# Patient Record
Sex: FEMALE | Race: WHITE | Employment: PART TIME | URBAN - METROPOLITAN AREA
[De-identification: names, ages, dates, MRNs, and addresses within clinical notes are randomized per-mention and may not be internally consistent; named-entity substitution may affect disease eponyms.]

---

## 2016-11-03 LAB
ANTIBODY SCREEN, EXTERNAL: NEGATIVE
CHLAMYDIA, EXTERNAL: NEGATIVE
HBSAG, EXTERNAL: NEGATIVE
HIV, EXTERNAL: NONREACTIVE
N. GONORRHEA, EXTERNAL: NEGATIVE
RPR, EXTERNAL: NONREACTIVE
RUBELLA, EXTERNAL: NORMAL
TYPE, ABO & RH, EXTERNAL: NORMAL

## 2017-02-07 LAB — GRBS, EXTERNAL: POSITIVE

## 2017-02-27 ENCOUNTER — HOSPITAL ENCOUNTER (INPATIENT)
Age: 32
LOS: 3 days | Discharge: HOME | DRG: 560 | End: 2017-03-02
Attending: SPECIALIST | Admitting: SPECIALIST
Payer: MEDICAID

## 2017-02-27 PROBLEM — Z34.90 PREGNANCY: Status: ACTIVE | Noted: 2017-02-27

## 2017-02-27 LAB
ABO + RH BLD: NORMAL
BLOOD GROUP ANTIBODIES SERPL: NORMAL
ERYTHROCYTE [DISTWIDTH] IN BLOOD BY AUTOMATED COUNT: 17 % (ref 11.5–14.5)
HCT VFR BLD AUTO: 24.2 % (ref 35–47)
HGB BLD-MCNC: 7.2 G/DL (ref 11.5–16)
MCH RBC QN AUTO: 22.9 PG (ref 26–34)
MCHC RBC AUTO-ENTMCNC: 29.8 G/DL (ref 30–36.5)
MCV RBC AUTO: 76.8 FL (ref 80–99)
PLATELET # BLD AUTO: 171 K/UL (ref 150–400)
RBC # BLD AUTO: 3.15 M/UL (ref 3.8–5.2)
SPECIMEN EXP DATE BLD: NORMAL
WBC # BLD AUTO: 7.2 K/UL (ref 3.6–11)

## 2017-02-27 PROCEDURE — 86900 BLOOD TYPING SEROLOGIC ABO: CPT | Performed by: SPECIALIST

## 2017-02-27 PROCEDURE — 74011250637 HC RX REV CODE- 250/637: Performed by: SPECIALIST

## 2017-02-27 PROCEDURE — 10907ZC DRAINAGE OF AMNIOTIC FLUID, THERAPEUTIC FROM PRODUCTS OF CONCEPTION, VIA NATURAL OR ARTIFICIAL OPENING: ICD-10-PCS | Performed by: SPECIALIST

## 2017-02-27 PROCEDURE — 36415 COLL VENOUS BLD VENIPUNCTURE: CPT | Performed by: SPECIALIST

## 2017-02-27 PROCEDURE — 85027 COMPLETE CBC AUTOMATED: CPT | Performed by: SPECIALIST

## 2017-02-27 PROCEDURE — 74011000258 HC RX REV CODE- 258: Performed by: SPECIALIST

## 2017-02-27 PROCEDURE — 59200 INSERT CERVICAL DILATOR: CPT

## 2017-02-27 PROCEDURE — 65270000029 HC RM PRIVATE

## 2017-02-27 PROCEDURE — 74011250636 HC RX REV CODE- 250/636: Performed by: SPECIALIST

## 2017-02-27 RX ORDER — ZOLPIDEM TARTRATE 5 MG/1
5 TABLET ORAL
Status: DISCONTINUED | OUTPATIENT
Start: 2017-02-27 | End: 2017-03-02 | Stop reason: HOSPADM

## 2017-02-27 RX ORDER — SODIUM CHLORIDE, SODIUM LACTATE, POTASSIUM CHLORIDE, CALCIUM CHLORIDE 600; 310; 30; 20 MG/100ML; MG/100ML; MG/100ML; MG/100ML
125 INJECTION, SOLUTION INTRAVENOUS CONTINUOUS
Status: DISCONTINUED | OUTPATIENT
Start: 2017-02-27 | End: 2017-03-02 | Stop reason: HOSPADM

## 2017-02-27 RX ORDER — OXYTOCIN IN 5 % DEXTROSE 30/500 ML
1-25 PLASTIC BAG, INJECTION (ML) INTRAVENOUS
Status: DISCONTINUED | OUTPATIENT
Start: 2017-02-27 | End: 2017-03-02 | Stop reason: HOSPADM

## 2017-02-27 RX ORDER — NALBUPHINE HYDROCHLORIDE 10 MG/ML
10 INJECTION, SOLUTION INTRAMUSCULAR; INTRAVENOUS; SUBCUTANEOUS
Status: DISCONTINUED | OUTPATIENT
Start: 2017-02-27 | End: 2017-03-02 | Stop reason: HOSPADM

## 2017-02-27 RX ORDER — SODIUM CHLORIDE 0.9 % (FLUSH) 0.9 %
5-10 SYRINGE (ML) INJECTION AS NEEDED
Status: DISCONTINUED | OUTPATIENT
Start: 2017-02-27 | End: 2017-03-02 | Stop reason: HOSPADM

## 2017-02-27 RX ORDER — SODIUM CHLORIDE 0.9 % (FLUSH) 0.9 %
5-10 SYRINGE (ML) INJECTION EVERY 8 HOURS
Status: DISCONTINUED | OUTPATIENT
Start: 2017-02-27 | End: 2017-03-02 | Stop reason: HOSPADM

## 2017-02-27 RX ADMIN — AMPICILLIN SODIUM 1 G: 1 INJECTION, POWDER, FOR SOLUTION INTRAMUSCULAR; INTRAVENOUS at 21:46

## 2017-02-27 RX ADMIN — AMPICILLIN SODIUM 2 G: 2 INJECTION, POWDER, FOR SOLUTION INTRAMUSCULAR; INTRAVENOUS at 17:52

## 2017-02-27 RX ADMIN — ZOLPIDEM TARTRATE 5 MG: 5 TABLET ORAL at 23:41

## 2017-02-27 RX ADMIN — NALBUPHINE HYDROCHLORIDE 10 MG: 10 INJECTION, SOLUTION INTRAMUSCULAR; INTRAVENOUS; SUBCUTANEOUS at 21:48

## 2017-02-27 NOTE — PROGRESS NOTES
1639 Patient arrived ambulatory from home for scheduled cervidil induction to L&D  2. Monitors applied. 1 Dr. Radha Youssef at bedside discussing [address]. Viewed fetal monitor. SVE for 3-4/50/0. Decided against cervidil. 65 Notified Dr. Radha Youssef of 7.2 hgb. Type and screen ordered    1850 Off monitor to walk/ birthing ball. 1945 Bedside and Verbal shift change report given to SYED Kilpatrick RN (oncoming nurse) by Lavon Nicolas RN (offgoing nurse). Report included the following information SBAR, Kardex, Procedure Summary, Intake/Output, MAR and Recent Results.

## 2017-02-27 NOTE — IP AVS SNAPSHOT
Current Discharge Medication List  
  
Take these medications at their scheduled times Dose & Instructions Dispensing Information Comments Morning Noon Evening Bedtime  
 docusate sodium 100 mg capsule Commonly known as:  Ltanya Florentin Your next dose is: Today, Tomorrow Other:  ____________ Dose:  100 mg Take 1 Cap by mouth two (2) times a day for 90 days. Quantity:  60 Cap Refills:  2 FLINTSTONES MULTIVITAMIN chewable tablet Generic drug:  pediatric multivitamins Your next dose is: Today, Tomorrow Other:  ____________ Dose:  1 Tab Take 1 Tab by mouth daily. Refills:  0  
     
   
   
   
  
 ibuprofen 800 mg tablet Commonly known as:  MOTRIN Your next dose is: Today, Tomorrow Other:  ____________ Dose:  800 mg Take 1 Tab by mouth every eight (8) hours. Quantity:  30 Tab Refills:  1 Take these medications as needed Dose & Instructions Dispensing Information Comments Morning Noon Evening Bedtime  
 oxyCODONE-acetaminophen 7.5-325 mg per tablet Commonly known as:  PERCOCET 7.5 Your next dose is: Today, Tomorrow Other:  ____________ Dose:  1 Tab Take 1 Tab by mouth every four (4) hours as needed. Max Daily Amount: 6 Tabs. Quantity:  15 Tab Refills:  0  
     
   
   
   
  
 zolpidem 10 mg tablet Commonly known as:  AMBIEN Your next dose is: Today, Tomorrow Other:  ____________ Dose:  10 mg Take 10 mg by mouth nightly as needed for Sleep. Indications: SLEEP-ONSET INSOMNIA Refills:  0 Where to Get Your Medications Information about where to get these medications is not yet available ! Ask your nurse or doctor about these medications  
  docusate sodium 100 mg capsule  
 ibuprofen 800 mg tablet  
 oxyCODONE-acetaminophen 7.5-325 mg per tablet

## 2017-02-27 NOTE — H&P
History & Physical    Name: Saida White MRN: [id number]  SSN: [id number]    YOB: 1985  Age: 32 y.o. Sex: female        Subjective:     Estimated Date of Delivery: None noted. OB History    Para Term  AB SAB TAB Ectopic Multiple Living   7 6 5 1     0 6      # Outcome Date GA Lbr Rian/2nd Weight Sex Delivery Anes PTL Lv   7 Current            6 Term 12/31/15 38w2d / 00:12 3.32 kg M VAGINAL DELI EPIDURAL AN N Y   5 Term 01/15/14 39w1d 05:15 / 00:08 4.32 kg M VAGINAL DELI  N Y   4 Term 12 37w4d 18:55 / 00:14 3.25 kg F VAGINAL DELI EPIDURAL AN N Y   3 Term 12/02/10        Y   2 Term 07 38w0d   F VAGINAL DELI   Y   1  05 33w0d   M VAGINAL DELI None  Y          Ms. Felipe Gómez is admitted with pregnancy at Unknown for early labor/PIH. Prenatal course was normal. Please see prenatal records for details. Past Medical History:   Diagnosis Date    Abnormal Pap smear     HPV/ ASCUS    Anemia     with this pregnancy    Anemia NEC     Chlamydia     treated 2013    Essential hypertension     with previous pregnancies    Genital herpes, unspecified     non in 5 years not on valtrex    Gonorrhea     unknown by pt    HX OTHER MEDICAL      H/O UTI prior to this pregnancy    Hypertension     only with prior pregnancy     No past surgical history on file. Social History     Occupational History    Not on file.      Social History Main Topics    Smoking status: Never Smoker    Smokeless tobacco: Not on file    Alcohol use No    Drug use: No    Sexual activity: Yes     Partners: Male     Family History   Problem Relation Age of Onset    Stroke Mother     Hypertension Mother     Asthma Sister     Diabetes Maternal Uncle     Cancer Maternal Grandmother     Hypertension Maternal Grandmother     Cancer Maternal Grandfather     Diabetes Father     Diabetes Maternal Aunt        No Known Allergies  Prior to Admission medications    Medication Sig Start Date End Date Taking? Authorizing Provider   ibuprofen (MOTRIN) 800 mg tablet Take 1 Tab by mouth every eight (8) hours as needed for Pain. 1/1/16   Aram Agrawal MD   HYDROcodone-acetaminophen [facility]) 5-325 mg per tablet Take 2 Tabs by mouth every six (6) hours as needed. Max Daily Amount: 8 Tabs. 1/1/16   Aram Agrawal MD   zolpidem (AMBIEN) 10 mg tablet Take 10 mg by mouth nightly as needed for Sleep. Indications: SLEEP-ONSET INSOMNIA    Historical Provider   pediatric multivitamins (FLINTSTONES MULTIVITAMIN) chewable tablet Take 1 Tab by mouth daily. Benedict Avina MD        Review of Systems: A comprehensive review of systems was negative except for that written in the HPI. Objective:     Vitals:  Vitals:    02/27/17 1641 02/27/17 1649   BP: 151/79 141/75   Pulse: (!) 109 100   Resp:  16   Temp:  98.1 °F (36.7 °C)        Physical Exam:  Cervix 3-4cm/50/0  Membranes:  Intact  Fetal Heart Rate: Reactive    Prenatal Labs:   Lab Results   Component Value Date/Time    Rubella, External nonimmune  08/07/2015    GrBStrep, External positive 12/10/2015    HBsAg, External negative 12/18/2015    HIV, External nonreactive 08/07/2015    RPR, External non reactive 12/18/2015    Gonorrhea, External negative 08/07/2015    Chlamydia, External negative 08/07/2015    ABO,Rh O positive 12/18/2015         Assessment/Plan:     Active Problems:    Pregnancy (2/27/2017)         Plan: Admit for Continue plan for vaginal delivery. Group B Strep was positive, will treat prophylactically with ampicillin.  Hold cervidil due to reg ctx and cervix changed from last week    Signed By:  Aram Agrawal MD     February 27, 2017

## 2017-02-28 ENCOUNTER — ANESTHESIA EVENT (OUTPATIENT)
Dept: LABOR AND DELIVERY | Age: 32
DRG: 560 | End: 2017-02-28
Payer: MEDICAID

## 2017-02-28 ENCOUNTER — ANESTHESIA (OUTPATIENT)
Dept: LABOR AND DELIVERY | Age: 32
DRG: 560 | End: 2017-02-28
Payer: MEDICAID

## 2017-02-28 PROCEDURE — 74011250637 HC RX REV CODE- 250/637: Performed by: SPECIALIST

## 2017-02-28 PROCEDURE — 74011000258 HC RX REV CODE- 258: Performed by: SPECIALIST

## 2017-02-28 PROCEDURE — 77030007880 HC KT SPN EPDRL BBMI -B

## 2017-02-28 PROCEDURE — 74011250636 HC RX REV CODE- 250/636: Performed by: SPECIALIST

## 2017-02-28 PROCEDURE — 75410000002 HC LABOR FEE PER 1 HR

## 2017-02-28 PROCEDURE — 75410000003 HC RECOV DEL/VAG/CSECN EA 0.5 HR

## 2017-02-28 PROCEDURE — 74011250636 HC RX REV CODE- 250/636: Performed by: ANESTHESIOLOGY

## 2017-02-28 PROCEDURE — 65410000002 HC RM PRIVATE OB

## 2017-02-28 PROCEDURE — 74011250637 HC RX REV CODE- 250/637

## 2017-02-28 RX ORDER — BUPIVACAINE HYDROCHLORIDE 5 MG/ML
30 INJECTION, SOLUTION EPIDURAL; INTRACAUDAL ONCE
Status: ACTIVE | OUTPATIENT
Start: 2017-02-28 | End: 2017-02-28

## 2017-02-28 RX ORDER — NALOXONE HYDROCHLORIDE 0.4 MG/ML
0.4 INJECTION, SOLUTION INTRAMUSCULAR; INTRAVENOUS; SUBCUTANEOUS AS NEEDED
Status: DISCONTINUED | OUTPATIENT
Start: 2017-02-28 | End: 2017-03-02 | Stop reason: HOSPADM

## 2017-02-28 RX ORDER — BUPIVACAINE HYDROCHLORIDE 5 MG/ML
30 INJECTION, SOLUTION EPIDURAL; INTRACAUDAL AS NEEDED
Status: DISCONTINUED | OUTPATIENT
Start: 2017-02-28 | End: 2017-02-28 | Stop reason: HOSPADM

## 2017-02-28 RX ORDER — IBUPROFEN 400 MG/1
800 TABLET ORAL EVERY 8 HOURS
Status: DISCONTINUED | OUTPATIENT
Start: 2017-02-28 | End: 2017-03-02 | Stop reason: HOSPADM

## 2017-02-28 RX ORDER — MISOPROSTOL 200 UG/1
TABLET ORAL
Status: COMPLETED
Start: 2017-02-28 | End: 2017-02-28

## 2017-02-28 RX ORDER — FENTANYL CITRATE 50 UG/ML
100 INJECTION, SOLUTION INTRAMUSCULAR; INTRAVENOUS ONCE
Status: DISCONTINUED | OUTPATIENT
Start: 2017-02-28 | End: 2017-02-28 | Stop reason: HOSPADM

## 2017-02-28 RX ORDER — OXYTOCIN/RINGER'S LACTATE 20/1000 ML
125-500 PLASTIC BAG, INJECTION (ML) INTRAVENOUS ONCE
Status: ACTIVE | OUTPATIENT
Start: 2017-02-28 | End: 2017-03-01

## 2017-02-28 RX ORDER — FENTANYL/BUPIVACAINE/NS/PF 2-1250MCG
1-16 PREFILLED PUMP RESERVOIR EPIDURAL CONTINUOUS
Status: DISCONTINUED | OUTPATIENT
Start: 2017-02-28 | End: 2017-03-02 | Stop reason: HOSPADM

## 2017-02-28 RX ORDER — ZOLPIDEM TARTRATE 5 MG/1
5 TABLET ORAL
Status: DISCONTINUED | OUTPATIENT
Start: 2017-02-28 | End: 2017-02-28 | Stop reason: SDUPTHER

## 2017-02-28 RX ORDER — OXYCODONE AND ACETAMINOPHEN 7.5; 325 MG/1; MG/1
1 TABLET ORAL
Status: DISCONTINUED | OUTPATIENT
Start: 2017-02-28 | End: 2017-03-02 | Stop reason: HOSPADM

## 2017-02-28 RX ORDER — NALOXONE HYDROCHLORIDE 0.4 MG/ML
0.4 INJECTION, SOLUTION INTRAMUSCULAR; INTRAVENOUS; SUBCUTANEOUS AS NEEDED
Status: DISCONTINUED | OUTPATIENT
Start: 2017-02-28 | End: 2017-02-28 | Stop reason: HOSPADM

## 2017-02-28 RX ORDER — HYDROCORTISONE ACETATE PRAMOXINE HCL 2.5; 1 G/100G; G/100G
CREAM TOPICAL AS NEEDED
Status: DISCONTINUED | OUTPATIENT
Start: 2017-02-28 | End: 2017-03-02 | Stop reason: HOSPADM

## 2017-02-28 RX ADMIN — AMPICILLIN SODIUM 1 G: 1 INJECTION, POWDER, FOR SOLUTION INTRAMUSCULAR; INTRAVENOUS at 01:57

## 2017-02-28 RX ADMIN — AMPICILLIN SODIUM 1 G: 1 INJECTION, POWDER, FOR SOLUTION INTRAMUSCULAR; INTRAVENOUS at 06:04

## 2017-02-28 RX ADMIN — Medication 2 MILLI-UNITS/MIN: at 07:43

## 2017-02-28 RX ADMIN — IBUPROFEN 800 MG: 400 TABLET, FILM COATED ORAL at 12:17

## 2017-02-28 RX ADMIN — SODIUM CHLORIDE, POTASSIUM CHLORIDE, SODIUM LACTATE AND CALCIUM CHLORIDE 125 ML/HR: 600; 310; 30; 20 INJECTION, SOLUTION INTRAVENOUS at 06:00

## 2017-02-28 RX ADMIN — IBUPROFEN 800 MG: 400 TABLET, FILM COATED ORAL at 20:43

## 2017-02-28 RX ADMIN — HYDROCORTISONE ACETATE PRAMOXINE HCL: 2.5; 1 CREAM TOPICAL at 20:47

## 2017-02-28 RX ADMIN — MISOPROSTOL 600 MCG: 200 TABLET ORAL at 09:18

## 2017-02-28 NOTE — PROGRESS NOTES
1930 Received report from [address] [place] [address]    2100 called Dr Lamount Sandhoff for Beatrice Risk and nubain orders, orders received    [phone] 31 Report given to Delvin Dk Dr Lamount Sandhoff at bedside to view strip, AROM and check patient    5674 Orders received to start pitocin

## 2017-02-28 NOTE — ANESTHESIA PREPROCEDURE EVALUATION
Anesthetic History   No history of anesthetic complications            Review of Systems / Medical History  Patient summary reviewed, nursing notes reviewed and pertinent labs reviewed    Pulmonary  Within defined limits                 Neuro/Psych   Within defined limits           Cardiovascular    Hypertension: well controlled                   GI/Hepatic/Renal  Within defined limits              Endo/Other  Within defined limits           Other Findings              Physical Exam    Airway  Mallampati: II  TM Distance: > 6 cm  Neck ROM: normal range of motion   Mouth opening: Normal     Cardiovascular  Regular rate and rhythm,  S1 and S2 normal,  no murmur, click, rub, or gallop             Dental  No notable dental hx       Pulmonary  Breath sounds clear to auscultation               Abdominal  GI exam deferred       Other Findings            Anesthetic Plan    ASA: 2  Anesthesia type: epidural          Induction: Intravenous  Anesthetic plan and risks discussed with: Patient

## 2017-02-28 NOTE — ROUTINE PROCESS
TRANSFER - IN REPORT:    Verbal report received from JONAH Munoz RN(name) on Genevieve Hair  being received from l&d(unit) for routine progression of care      Report consisted of patients Situation, Background, Assessment and   Recommendations(SBAR). Information from the following report(s) SBAR, Kardex, Intake/Output, MAR and Recent Results was reviewed with the receiving nurse. Opportunity for questions and clarification was provided. Assessment completed upon patients arrival to unit and care assumed. 6800-2731 hourly rounds complete. 1315 Pt up to bathroom. Voided moderate amount and self care done. Check void complete. 7218-0827 hourly rounds complete. Preceptor Review of [facility] Work      Shift times - 1150 to 2000    The documentation of patient care has been reviewed and approved. All medications have been administered under the direct supervision of the preceptor.     Sonja Crockett RN

## 2017-02-28 NOTE — PROGRESS NOTES
2237 Bedside shift change report given to JONAH Darnell RN (oncoming nurse) by SYED Carlisle RN (offgoing nurse). Report included the following information SBAR, Intake/Output, MAR, Accordion and Recent Results. 0830  Pt requests epidural. Orders requested from Dr. Storm Koyanagi. Education and bolus started. [place] [address] 23 Dr. Storm Koyanagi for epidural. Dr. Storm Koyanagi in ICU doing procedure. Will come to place epidural after. 8771 Dr. Ad Mann in room to place epidural.   6703  Pt states that she feels pressure. SVE 10/10/+5. Dr Denise Robert called. Dr. Courtney Miller requested at bedside. 4460 Dr. Courtney Miller at bedside. Whitney Damico RN gloved and supporting perineum.  assisted by E. Rojelio Osler, observed by Dr Rogers Robert in room to continue delivery care. Dr Courtney Miller out of room. Cytotec given rectally    1050 Patient up and out of bed to bathroom. Denies feeling dizzy or light-headed. 1150 TRANSFER - OUT REPORT:    Verbal report given to YOSEF Marie RN(name) on Rogena Galeazzi  being transferred to [facility](unit) for routine progression of care       Report consisted of patients Situation, Background, Assessment and   Recommendations(SBAR). Information from the following report(s) SBAR, Procedure Summary, Intake/Output, MAR, Accordion, Recent Results and Med Rec Status was reviewed with the receiving nurse. Lines:   Peripheral IV 17 Left Hand (Active)   Site Assessment Clean, dry, & intact 2017  7:29 AM   Phlebitis Assessment 0 2017  7:29 AM   Infiltration Assessment 0 2017  7:29 AM   Dressing Status Clean, dry, & intact 2017  7:29 AM   Dressing Type Transparent 2017  7:29 AM   Hub Color/Line Status Pink 2017  7:29 AM   Alcohol Cap Used Yes 2017  5:48 PM        Opportunity for questions and clarification was provided.       Patient transported with:   Registered Nurse

## 2017-03-01 PROCEDURE — 74011250637 HC RX REV CODE- 250/637: Performed by: SPECIALIST

## 2017-03-01 PROCEDURE — 65410000002 HC RM PRIVATE OB

## 2017-03-01 PROCEDURE — 77030021125

## 2017-03-01 PROCEDURE — 74011250637 HC RX REV CODE- 250/637: Performed by: OBSTETRICS & GYNECOLOGY

## 2017-03-01 RX ORDER — ACETAMINOPHEN 325 MG/1
650 TABLET ORAL
Status: DISCONTINUED | OUTPATIENT
Start: 2017-03-01 | End: 2017-03-02 | Stop reason: HOSPADM

## 2017-03-01 RX ADMIN — IBUPROFEN 800 MG: 400 TABLET, FILM COATED ORAL at 06:33

## 2017-03-01 RX ADMIN — IBUPROFEN 800 MG: 400 TABLET, FILM COATED ORAL at 14:39

## 2017-03-01 RX ADMIN — ACETAMINOPHEN 650 MG: 325 TABLET, FILM COATED ORAL at 11:57

## 2017-03-01 RX ADMIN — IBUPROFEN 800 MG: 400 TABLET, FILM COATED ORAL at 22:10

## 2017-03-01 NOTE — ROUTINE PROCESS
0800- Bedside shift change report given to Marvie Soulier RN (oncoming nurse) by Ashley Arizmendi RN (offgoing nurse).  Report included the following information SBAR.     7999-7015 hourly rounding done  7879-5496 hourly rounding done  9020-5344 hourly rounding done

## 2017-03-01 NOTE — LACTATION NOTE
Couplet Interdisciplinary Rounds     MATERNAL    Daily Goal:     Influenza screening completed: Patient refused   Tdap screening completed: Patient refused   Rhogam Given:N/A  MMR Given:NO    VTE Prophylaxis: Not indicated, per Provider order    EPDS:            Patient Name: Reese Franco Diagnosis: pregnancy  Pregnancy   Date of Admission: 2017 LOS: 2  Gestational Age: Gestational Age: <None>       Daily Goal:     Birth Weight: No birth weight on file.  Current Weight: Weight: 78.5 kg (173 lb)  % of Weight Change: Birth weight not on file    Feeding:   Metabolic Screen: NO    Hepatitis B:  YES    Discharge Bili:  NO  Car Seat Trial, if needed:  NO      Patient/Family Teaching Needs:     Days before discharge: One day until discharge    In Attendance:  Nursing and Physician

## 2017-03-01 NOTE — PROGRESS NOTES
Bedside shift change report given to JONAH Leos RN (oncoming nurse) by YOSEF Marie RN (offgoing nurse).  Report included the following information SBAR, MAR.       1989-3051 Hourly rounds complete    2509-9537 hourly rounds complete    1974-1621 hourly rounds complete

## 2017-03-01 NOTE — PROGRESS NOTES
Post-Partum Day Number 1 Progress Note    Jase Tirana     Information for the patient's :  Anton Suazo [[id number]]   Vaginal, Spontaneous Delivery   Patient doing well without significant complaint. Voiding without difficulty, normal lochia. Vitals:    Visit Vitals    /71 (BP 1 Location: Left arm, BP Patient Position: At rest)    Pulse 70    Temp 97.8 °F (36.6 °C)    Resp 16    Ht 5' 4\" (1.626 m)    Wt 78.5 kg (173 lb)    Breastfeeding Unknown    BMI 29.7 kg/m2     Temp (24hrs), Av.1 °F (36.7 °C), Min:97.8 °F (36.6 °C), Max:98.8 °F (37.1 °C)          Exam:  Patient without distress. Abdomen soft, fundus firm, nontender                Perineum with normal lochia noted. Lower extremities are negative for swelling, cords or tenderness.     Labs:     Lab Results   Component Value Date/Time    WBC 7.2 2017 05:46 PM    WBC 7.2 2015 03:39 PM    WBC 9.8 01/15/2014 07:15 AM    WBC 8.1 2013 01:45 PM    WBC 7.8 2012 09:20 AM    WBC 7.9 2010 03:58 PM    WBC 8.5 2010 07:40 PM    WBC 8.2 2010 07:40 PM    HGB 7.2 2017 05:46 PM    HGB 11.0 2015 03:39 PM    HGB 7.8 01/15/2014 07:15 AM    HGB 8.6 2013 01:45 PM    HGB 8.0 2012 09:20 AM    HGB 8.5 2010 03:58 PM    HGB 8.7 2010 07:40 PM    HGB 8.8 2010 07:40 PM    HCT 24.2 2017 05:46 PM    HCT 32.6 2015 03:39 PM    HCT 26.0 01/15/2014 07:15 AM    HCT 27.4 2013 01:45 PM    HCT 26.0 2012 09:20 AM    HCT 27.5 2010 03:58 PM    HCT 27.0 2010 07:40 PM    HCT 26.8 2010 07:40 PM    PLATELET 016  05:46 PM    PLATELET 778  03:39 PM    PLATELET 604  07:15 AM    PLATELET 890  01:45 PM    PLATELET 826  09:20 AM    PLATELET 649  03:58 PM    PLATELET 233  07:40 PM    PLATELET 997  07:40 PM       No results found for this or any previous visit (from the past 24 hour(s)). Assessment: Doing well, post partum day 1      Plan:  1. Continue routine postpartum and perineal care as well as maternal education.

## 2017-03-01 NOTE — LACTATION NOTE
1420-   Couplet Interdisciplinary Rounds     MATERNAL    Daily Goal:     Influenza screening completed: YES   Tdap screening completed: YES   Rhogam Given:NO  MMR Given:NO    VTE Prophylaxis: Not indicated, per Provider order    EPDS: [place]  Depression Scale  I have been able to laugh and see the funny side of things: As much as I always could  I have looked forward with enjoyment to things: As much as I ever did  I have blamed myself unnecessarily when things went wrong: No, never  I have been anxious or worried for no good reason: No, not at all  I have felt scared or panicky for no very good reason: No, not at all  Things have been getting on top of me: No, I have been coping as well as ever  I have been so unhappy that I have had difficulty sleeping: No, not at all  I have felt sad or miserable: No, not at all  I have been so unhappy that I have been crying: No, never  The thought of harming myself has occurred to me: Never  Total Score: 0          Patient Name: Genevieve Esteban Diagnosis: pregnancy  Pregnancy   Date of Admission: 2017 LOS: 2  Gestational Age: Gestational Age: <None>       Daily Goal:     Birth Weight: No birth weight on file.  Current Weight: Weight: 78.5 kg (173 lb)  % of Weight Change: Birth weight not on file    Feeding:  [place] Metabolic Screen: NO    Hepatitis B:  NO    Discharge Bili:  NO  Car Seat Trial, if needed:  N/A      Patient/Family Teaching Needs:     Days before discharge: One day until discharge    In Attendance:  Nursing

## 2017-03-02 VITALS
HEIGHT: 64 IN | HEART RATE: 74 BPM | DIASTOLIC BLOOD PRESSURE: 65 MMHG | TEMPERATURE: 98.2 F | WEIGHT: 173 LBS | BODY MASS INDEX: 29.53 KG/M2 | RESPIRATION RATE: 16 BRPM | SYSTOLIC BLOOD PRESSURE: 110 MMHG

## 2017-03-02 PROCEDURE — 74011250637 HC RX REV CODE- 250/637: Performed by: SPECIALIST

## 2017-03-02 RX ORDER — DOCUSATE SODIUM 100 MG/1
100 CAPSULE, LIQUID FILLED ORAL 2 TIMES DAILY
Qty: 60 CAP | Refills: 2 | Status: SHIPPED | OUTPATIENT
Start: 2017-03-02 | End: 2017-05-31

## 2017-03-02 RX ORDER — IBUPROFEN 800 MG/1
800 TABLET ORAL EVERY 8 HOURS
Qty: 30 TAB | Refills: 1 | Status: ON HOLD | OUTPATIENT
Start: 2017-03-02 | End: 2020-03-18 | Stop reason: SDUPTHER

## 2017-03-02 RX ORDER — OXYCODONE AND ACETAMINOPHEN 7.5; 325 MG/1; MG/1
1 TABLET ORAL
Qty: 15 TAB | Refills: 0 | Status: SHIPPED | OUTPATIENT
Start: 2017-03-02 | End: 2020-03-18

## 2017-03-02 RX ADMIN — IBUPROFEN 800 MG: 400 TABLET, FILM COATED ORAL at 06:22

## 2017-03-02 NOTE — ROUTINE PROCESS
Bedside shift change report given to Alberta Gonzalez RN (oncoming nurse) by Bobby Edwards RN (offgoing nurse). Report included the following information SBAR, Kardex, Intake/Output, MAR and Recent Results. 3351-2853 Hourly rounds completed.

## 2017-03-02 NOTE — DISCHARGE SUMMARY
Obstetrical Discharge Summary     Name: Damon Barreto MRN: [id number]  SSN: [id number]    YOB: 1985  Age: 32 y.o. Sex: female      Admit Date: 2017    Discharge Date: 3/2/2017     Admitting Physician: Lorna Knott MD     Attending Physician:  Lorna Knott MD     * Admission Diagnoses: pregnancy  Pregnancy    * Discharge Diagnoses:   Information for the patient's :  mAari Sassafras [[id number]]   Delivery of a 4.065 kg male infant via Vaginal, Spontaneous Delivery on 2017 at 9:10 AM  by . Apgars were 9 and 9.        Additional Diagnoses:   Hospital Problems as of 3/2/2017  Date Reviewed: 2010          Codes Class Noted - Resolved POA    Pregnancy ICD-10-CM: Z33.1  ICD-9-CM: V22.2  2017 - Present Unknown             Lab Results   Component Value Date/Time    ABO/Rh(D) O POSITIVE 2017 05:46 PM    Rubella, External NonImmune 2016    GrBStrep, External Positive 2017    ABO,Rh O Positive 2016      Immunization History   Administered Date(s) Administered    MMR 2014, 2016    Tdap 2015       * Procedures: vaginal delivery  * No surgery found *      [employer]  Depression Scale  I have been able to laugh and see the funny side of things: As much as I always could  I have looked forward with enjoyment to things: As much as I ever did  I have blamed myself unnecessarily when things went wrong: No, never  I have been anxious or worried for no good reason: No, not at all  I have felt scared or panicky for no very good reason: No, not at all  Things have been getting on top of me: No, I have been coping as well as ever  I have been so unhappy that I have had difficulty sleeping: No, not at all  I have felt sad or miserable: No, not at all  I have been so unhappy that I have been crying: No, never  The thought of harming myself has occurred to me: Never  Total Score: 0    * Discharge Condition: good    * Hospital Course: Normal hospital course following the delivery. * Disposition: Home    Discharge Medications:   Current Discharge Medication List      START taking these medications    Details   oxyCODONE-acetaminophen (PERCOCET 7.5) 7.5-325 mg per tablet Take 1 Tab by mouth every four (4) hours as needed. Max Daily Amount: 6 Tabs. Qty: 15 Tab, Refills: 0      docusate sodium (COLACE) 100 mg capsule Take 1 Cap by mouth two (2) times a day for 90 days. Qty: 60 Cap, Refills: 2         CONTINUE these medications which have CHANGED    Details   ibuprofen (MOTRIN) 800 mg tablet Take 1 Tab by mouth every eight (8) hours. Qty: 30 Tab, Refills: 1         CONTINUE these medications which have NOT CHANGED    Details   zolpidem (AMBIEN) 10 mg tablet Take 10 mg by mouth nightly as needed for Sleep. Indications: SLEEP-ONSET INSOMNIA      pediatric multivitamins (FLINTSTONES MULTIVITAMIN) chewable tablet Take 1 Tab by mouth daily. STOP taking these medications       HYDROcodone-acetaminophen (NORCO) 5-325 mg per tablet Comments:   Reason for Stopping:               * Follow-up Care/Patient Instructions:   Activity: Activity as tolerated, No driving for 2 weeks, No sex for 6 weeks, No driving while on analgesics and No heavy lifting for 6 weeks  Diet: Regular Diet  Wound Care: Keep wound clean and dry    Follow-up Information     Follow up With Details Comments [address] Schedule an appointment as soon as possible for a visit in 1 month or , As needed, If symptoms worsen 730-0800           Signed By:  Gerhard Ling MD     March 2, 2017

## 2017-03-02 NOTE — PROGRESS NOTES
Bedside and Verbal shift change report given to Sheron Galvan RN (oncoming nurse) by AZALEA Bautista RN (offgoing nurse). Report included the following information SBAR.      Hourly rounds completed from 4351-5874  Hourly rounds completed from 0164-6475  Hourly rounds completed from 3291-8948

## 2017-03-02 NOTE — PROGRESS NOTES
Post-Partum Day Number 2 Progress/Discharge Note    Patient doing well post-partum without significant complaint. Voiding without difficulty, normal lochia, positive flatus. Vitals:  Patient Vitals for the past 8 hrs:   BP Temp Pulse Resp   17 0623 101/56 98.3 °F (36.8 °C) 69 15     Temp (24hrs), Av.1 °F (36.7 °C), Min:97.9 °F (36.6 °C), Max:98.3 °F (36.8 °C)      Vital signs stable, afebrile. Exam:  Patient without distress. Abdomen soft, fundus firm at level of umbilicus, non tender               Perineum with normal lochia noted. Lower extremities are negative for swelling, cords or tenderness. Lab/Data Review:  BMP: No results found for: NA, K, CL, CO2, AGAP, GLU, BUN, CREA, GFRAA, GFRNA  CMP: No results found for: NA, K, CL, CO2, AGAP, GLU, BUN, CREA, GFRAA, GFRNA, CA, MG, PHOS, ALB, TBIL, TP, ALB, GLOB, AGRAT, SGOT, ALT, GPT  CBC: No results found for: WBC, HGB, HGBEXT, HCT, HCTEXT, PLT, PLTEXT, HGBEXT, HCTEXT, PLTEXT    Assessment and Plan:  Patient appears to be having uncomplicated post-partum course. Continue routine perineal care and maternal education. Plan discharge for today with follow up in our office in 6 weeks.

## 2017-03-02 NOTE — DISCHARGE INSTRUCTIONS
POSTPARTUM DISCHARGE INSTRUCTIONS       Name:  Richard Velez  YOB: 1985  Admission Diagnosis:  pregnancy  Pregnancy     Discharge Diagnosis:    Problem List as of 3/2/2017  Date Reviewed: 12/2/2010          Codes Class Noted - Resolved    Pregnancy ICD-10-CM: Z33.1  ICD-9-CM: V22.2  2/27/2017 - Present        Pregnant ICD-10-CM: Z33.1  ICD-9-CM: V22.2  12/30/2015 - Present            Attending Physician:  Judy Crandall MD    Delivery Type:  Vaginal Childbirth: What To Expect At Home    Your Recovery: Your body will slowly heal in the next few weeks. It is easy to get too tired and overwhelmed during the first weeks after your baby is born. Changes in your hormones can shift your mood without warning. You may find it hard to meet the extra demands on your energy and time. Take it easy on yourself. Follow-up care is a key part of your treatment and safety. Be sure to make and go to all appointments, and call your doctor if you are having problems. It's also a good idea to know your test results and keep a list of the medicines you take. How can you care for yourself at home? Vaginal bleeding and cramps  · After delivery, you will have a bloody discharge from the vagina. This will turn pink within a week and then white or yellow after about 10 days. It may last for 2 to 4 weeks or longer, until the uterus has healed. Use pads instead of tampons until you stop bleeding. · Do not worry if you pass some blood clots, as long as they are smaller than a golf ball. If you have a tear or stitches in your vaginal area, change the pad at least every 4 hours to prevent soreness and infection. · You may have cramps for the first few days after childbirth. These are normal and occur as the uterus shrinks to normal size. Take an over-the-counter pain medicine, such as acetaminophen (Tylenol), ibuprofen (Advil, Motrin), or naproxen (Aleve), for cramps. Read and follow all instructions on the label.  Do not take aspirin, because it can cause more bleeding. Do not take acetaminophen (Tylenol) and other acetaminophen containing medications (i.e. Percocet) at the same time. Breast fullness  · Your breasts may overfill (engorge) in the first few days after delivery. To help milk flow and to relieve pain, warm your breasts in the shower or by using warm, moist towels before nursing. · If you are not nursing, do not put warmth on your breasts or touch your breasts. Wear a tight bra or sports bra and use ice until the fullness goes away. This usually takes 2 to 3 days. · Put ice or a cold pack on your breast after nursing to reduce swelling and pain. Put a thin cloth between the ice and your skin. Activity  · Eat a balanced diet. Do not try to lose weight by cutting calories. Keep taking your prenatal vitamins, or take a multivitamin. · Get as much rest as you can. Try to take naps when your baby sleeps during the day. · Get some exercise every day. But do not do any heavy exercise until your doctor says it is okay. · Wait until you are healed (about 4 to 6 weeks) before you have sexual intercourse. Your doctor will tell you when it is okay to have sex. · Talk to your doctor about birth control. You can get pregnant even before your period returns. Also, you can get pregnant while you are breast-feeding. Mental Health  · Many women get the \"baby blues\" during the first few days after childbirth. You may lose sleep, feel irritable, and cry easily. You may feel happy one minute and sad the next. Hormone changes are one cause of these emotional changes. Also, the demands of a new baby, along with visits from relatives or other family needs, add to a mother's stress. The \"baby blues\" often peak around the fourth day. Then they ease up in less than 2 weeks. · If your moodiness or anxiety lasts for more than 2 weeks, or if you feel like life is not worth living, you may have postpartum depression.  This is different for each mother. Some mothers with serious depression may worry intensely about their infant's well-being. Others may feel distant from their child. Some mothers might even feel that they might harm their baby. A mother may have signs of paranoia, wondering if someone is watching her. · With all the changes in your life, you may not know if you are depressed. Pregnancy sometimes causes changes in how you feel that are similar to the symptoms of depression. · Symptoms of depression include:  · Feeling sad or hopeless and losing interest in daily activities. These are the most common symptoms of depression. · Sleeping too much or not enough. · Feeling tired. You may feel as if you have no energy. · Eating too much or too little. · POSTPARTUM [employer]PPORT INTERNATIONAL (PSI) offers a Warm line; Chat with the Expert phone sessions; Information and Articles about Pregnancy and Postpartum Mood Disorders; Comprehensive List of Free Support Groups; Knowledgeable local coordinators who will offer support, information, and resources; Guide to Resources on [place]; Calendar of events in the  mood disorders community; Latest News and Research; and [facility] [address] for [employer]. Remember - You are not alone; You are not to blame; With help, you will be well. 1-566-646-PPD(9060). WWW. POSTPARTUM. NET   · Writing or talking about death, such as writing suicide notes or talking about guns, knives, or pills. Keep the numbers for these national suicide hotlines: 3-205-153-TALK (9-775.191.4967) and 3-383-LJDIUKX (0-267.927.7038). If you or someone you know talks about suicide or feeling hopeless, get help right away. Constipation and Hemorrhoids  · Drink plenty of fluids, enough so that your urine is light yellow or clear like water. If you have kidney, heart, or liver disease and have to limit fluids, talk with your doctor before you increase the amount of fluids you drink.   · Eat plenty of fiber each day. Have a bran muffin or bran cereal for breakfast, and try eating a piece of fruit for a mid-afternoon snack. · For painful, itchy hemorrhoids, put ice or a cold pack on the area several times a day for 10 minutes at a time. Follow this by putting a warm compress on the area for another 10 to 20 minutes or by sitting in a shallow, warm bath. When should you call for help? Call 911 anytime you think you may need emergency care. For example, call if:  · You are thinking of hurting yourself, your baby, or anyone else. · You passed out (lost consciousness). · You have symptoms of a blood clot in your lung (called a pulmonary embolism). These may include:    · Sudden chest pain. · Trouble breathing. · Coughing up blood. Call your doctor now or seek immediate medical care if:  · You have severe vaginal bleeding. · You are soaking through a pad each hour for 2 or more hours. · Your vaginal bleeding seems to be getting heavier or is still bright red 4 days after delivery. · You are dizzy or lightheaded, or you feel like you may faint. · You are vomiting or cannot keep fluids down. · You have a fever. · You have new or more belly pain. · You pass tissue (not just blood). · Your vaginal discharge smells bad. · Your belly feels tender or full and hard. · Your breasts are continuously painful or red. · You feel sad, anxious, or hopeless for more than a few days. · You have sudden, severe pain in your belly. · You have symptoms of a blood clot in your leg (called a deep vein thrombosis),          such as:  · Pain in your calf, back of the knee, thigh, or groin. · Redness and swelling in your leg or groin. · You have symptoms of preeclampsia, such as:  · Sudden swelling of your face, hands, or feet. · New vision problems (such as dimness or blurring). · A severe headache. · Your blood pressure is higher than it should be or rises suddenly. · You have new nausea or vomiting.     Watch closely for changes in your health, and be sure to contact your doctor if you have any problems. Additional Information:  Not applicable    These are general instructions for a healthy lifestyle:    No smoking/ No tobacco products/ Avoid exposure to second hand smoke    Surgeon General's Warning:  Quitting smoking now greatly reduces serious risk to your health. Obesity, smoking, and sedentary lifestyle greatly increases your risk for illness    A healthy diet, regular physical exercise & weight monitoring are important for maintaining a healthy lifestyle    Recognize signs and symptoms of STROKE:    F-face looks uneven    A-arms unable to move or move unevenly    S-speech slurred or non-existent    T-time-call 911 as soon as signs and symptoms begin - DO NOT go       back to bed or wait to see if you get better - TIME IS BRAIN. I have had the opportunity to make my options or choices for discharge. I have received and understand these instructions. After Your Delivery (the Postpartum Period): Care Instructions  Your Care Instructions  Congratulations on the birth of your baby. Like pregnancy, the  period can be a time of excitement, hilary, and exhaustion. You may look at your wondrous little baby and feel happy. You may also be overwhelmed by your new sleep hours and new responsibilities. At first, babies often sleep during the days and are awake at night. They do not have a pattern or routine. They may make sudden gasps, jerk themselves awake, or look like they have crossed eyes. These are all normal, and they may even make you smile. In these first weeks after delivery, try to take good care of yourself. It may take 4 to 6 weeks to feel like yourself again, and possibly longer if you had a  birth. You will likely feel very tired for several weeks. Your days will be full of ups and downs, but lots of hilary as well. Follow-up care is a key part of your treatment and safety.  Be sure to make and go to all appointments, and call your doctor if you are having problems. It's also a good idea to know your test results and keep a list of the medicines you take. How can you care for yourself at home? Take care of your body after delivery  · Use pads instead of tampons for the bloody flow that may last as long as 2 weeks. · Ease cramps with ibuprofen (Advil, Motrin). · Ease soreness of hemorrhoids and the area between your vagina and rectum with ice compresses or witch hazel pads. · Ease constipation by drinking lots of fluid and eating high-fiber foods. Ask your doctor about over-the-counter stool softeners. · Cleanse yourself with a gentle squeeze of warm water from a bottle instead of wiping with toilet paper. · Take a sitz bath in warm water several times a day. · Wear a good nursing bra. Ease sore and swollen breasts with warm, wet washcloths. · If you are not breastfeeding, use ice rather than heat for breast soreness. · Your period may not start for several months if you are breastfeeding. You may bleed more, and longer at first, than you did before you got pregnant. · Wait until you are healed (about 4 to 6 weeks) before you have sexual intercourse. Your doctor will tell you when it is okay to have sex. · Try not to travel with your baby for 5 or 6 weeks. If you take a long car trip, make frequent stops to walk around and stretch. Avoid exhaustion  · Rest every day. Try to nap when your baby naps. · Ask another adult to be with you for a few days after delivery. · Plan for  if you have other children. · Stay flexible so you can eat at odd hours and sleep when you need to. Both you and your baby are making new schedules. · Plan small trips to get out of the house. Change can make you feel less tired. · Ask for help with housework, cooking, and shopping. Remind yourself that your job is to care for your baby.   Know about help for postpartum depression  · \"Baby blues\" are common for the first 1 to 2 weeks after birth. You may cry or feel sad or irritable for no reason. · Rest whenever you can. Being tired makes it harder to handle your emotions. · Go for walks with your baby. · Talk to your partner, friends, and family about your feelings. · If your symptoms last for more than a few weeks, or if you feel very depressed, ask your doctor for help. · Postpartum depression can be treated. Support groups and counseling can help. Sometimes medicine can also help. Stay healthy  · Eat healthy foods so you have more energy, make good breast milk, and lose extra baby pounds. · If you breastfeed, avoid alcohol and drugs. Stay smoke-free. If you quit during pregnancy, congratulations. · Start daily exercise after 4 to 6 weeks, but rest when you feel tired. · Learn exercises to tone your belly. Do Kegel exercises to regain strength in your pelvic muscles. You can do these exercises while you stand or sit. ¨ Squeeze the same muscles you would use to stop your urine. Your belly and thighs should not move. ¨ Hold the squeeze for 3 seconds, and then relax for 3 seconds. ¨ Start with 3 seconds. Then add 1 second each week until you are able to squeeze for 10 seconds. ¨ Repeat the exercise 10 to 15 times for each session. Do three or more sessions each day. · Find a class for new mothers and new babies that has an exercise time. · If you had a  birth, give yourself a bit more time before you exercise, and be careful. When should you call for help? Call 911 anytime you think you may need emergency care. For example, call if:  · You passed out (lost consciousness). Call your doctor now or seek immediate medical care if:  · You have severe vaginal bleeding. This means you are passing blood clots and soaking through a pad each hour for 2 or more hours. · You are dizzy or lightheaded, or you feel like you may faint. · You have a fever.   · You have new belly pain, or your pain gets worse. Watch closely for changes in your health, and be sure to contact your doctor if:  · Your vaginal bleeding seems to be getting heavier. · You have new or worse vaginal discharge. · You feel sad, anxious, or hopeless for more than a few days. · You do not get better as expected. Where can you learn more? Go to http://sahara-sebastian.info/. Enter A461 in the search box to learn more about \"After Your Delivery (the Postpartum Period): Care Instructions. \"  Current as of: May 30, 2016  Content Version: 11.1  © 7939-9586 [employer]. Care instructions adapted under license by [employer] (which disclaims liability or warranty for this information). If you have questions about a medical condition or this instruction, always ask your healthcare professional. [address] any warranty or liability for your use of this information.

## 2020-03-16 ENCOUNTER — ANESTHESIA (OUTPATIENT)
Dept: LABOR AND DELIVERY | Age: 35
DRG: 560 | End: 2020-03-16
Payer: MEDICAID

## 2020-03-16 ENCOUNTER — HOSPITAL ENCOUNTER (INPATIENT)
Age: 35
LOS: 2 days | Discharge: HOME | DRG: 560 | End: 2020-03-18
Attending: SPECIALIST | Admitting: SPECIALIST
Payer: MEDICAID

## 2020-03-16 ENCOUNTER — ANESTHESIA EVENT (OUTPATIENT)
Dept: LABOR AND DELIVERY | Age: 35
DRG: 560 | End: 2020-03-16
Payer: MEDICAID

## 2020-03-16 DIAGNOSIS — G89.18 POST-OP PAIN: Primary | ICD-10-CM

## 2020-03-16 PROBLEM — Z34.90 ENCOUNTER FOR ELECTIVE INDUCTION OF LABOR: Status: ACTIVE | Noted: 2020-03-16

## 2020-03-16 PROBLEM — Z64.1 GRAND MULTIPARA: Status: ACTIVE | Noted: 2020-03-16

## 2020-03-16 LAB
ABO + RH BLD: NORMAL
ALBUMIN SERPL-MCNC: 2.9 G/DL (ref 3.5–5)
ALBUMIN/GLOB SERPL: 0.6 {RATIO} (ref 1.1–2.2)
ALP SERPL-CCNC: 159 U/L (ref 45–117)
ALT SERPL-CCNC: 23 U/L (ref 12–78)
ANION GAP SERPL CALC-SCNC: 6 MMOL/L (ref 5–15)
AST SERPL-CCNC: 19 U/L (ref 15–37)
BASOPHILS # BLD: 0 K/UL (ref 0–0.1)
BASOPHILS NFR BLD: 0 % (ref 0–1)
BILIRUB SERPL-MCNC: 0.4 MG/DL (ref 0.2–1)
BLOOD GROUP ANTIBODIES SERPL: NORMAL
BUN SERPL-MCNC: 10 MG/DL (ref 6–20)
BUN/CREAT SERPL: 16 (ref 12–20)
CALCIUM SERPL-MCNC: 9 MG/DL (ref 8.5–10.1)
CHLORIDE SERPL-SCNC: 108 MMOL/L (ref 97–108)
CO2 SERPL-SCNC: 22 MMOL/L (ref 21–32)
CREAT SERPL-MCNC: 0.64 MG/DL (ref 0.55–1.02)
DIFFERENTIAL METHOD BLD: ABNORMAL
EOSINOPHIL # BLD: 0.1 K/UL (ref 0–0.4)
EOSINOPHIL NFR BLD: 1 % (ref 0–7)
ERYTHROCYTE [DISTWIDTH] IN BLOOD BY AUTOMATED COUNT: 14.6 % (ref 11.5–14.5)
EST. AVERAGE GLUCOSE BLD GHB EST-MCNC: 117 MG/DL
GLOBULIN SER CALC-MCNC: 4.8 G/DL (ref 2–4)
GLUCOSE SERPL-MCNC: 68 MG/DL (ref 65–100)
HBA1C MFR BLD: 5.7 % (ref 4–5.6)
HBV SURFACE AG SER QL: <0.1 INDEX
HBV SURFACE AG SER QL: NEGATIVE
HCT VFR BLD AUTO: 29.3 % (ref 35–47)
HCV AB SERPL QL IA: NONREACTIVE
HCV COMMENT,HCGAC: NORMAL
HGB BLD-MCNC: 9.4 G/DL (ref 11.5–16)
HIV1 P24 AG SERPL QL IA: NONREACTIVE
HIV1+2 AB SERPL QL IA: NONREACTIVE
IMM GRANULOCYTES # BLD AUTO: 0.1 K/UL (ref 0–0.04)
IMM GRANULOCYTES NFR BLD AUTO: 1 % (ref 0–0.5)
LYMPHOCYTES # BLD: 1.3 K/UL (ref 0.8–3.5)
LYMPHOCYTES NFR BLD: 13 % (ref 12–49)
MCH RBC QN AUTO: 27.6 PG (ref 26–34)
MCHC RBC AUTO-ENTMCNC: 32.1 G/DL (ref 30–36.5)
MCV RBC AUTO: 86.2 FL (ref 80–99)
MONOCYTES # BLD: 0.7 K/UL (ref 0–1)
MONOCYTES NFR BLD: 7 % (ref 5–13)
NEUTS SEG # BLD: 8.2 K/UL (ref 1.8–8)
NEUTS SEG NFR BLD: 78 % (ref 32–75)
NRBC # BLD: 0 K/UL (ref 0–0.01)
NRBC BLD-RTO: 0 PER 100 WBC
PLATELET # BLD AUTO: 223 K/UL (ref 150–400)
PMV BLD AUTO: 13 FL (ref 8.9–12.9)
POTASSIUM SERPL-SCNC: 3.5 MMOL/L (ref 3.5–5.1)
PROT SERPL-MCNC: 7.7 G/DL (ref 6.4–8.2)
RBC # BLD AUTO: 3.4 M/UL (ref 3.8–5.2)
RUBV IGG SER-IMP: NORMAL
RUBV IGG SERPL IA-ACNC: 7.5 IU/ML
SODIUM SERPL-SCNC: 136 MMOL/L (ref 136–145)
SPECIMEN EXP DATE BLD: NORMAL
WBC # BLD AUTO: 10.4 K/UL (ref 3.6–11)

## 2020-03-16 PROCEDURE — 4A1H74Z MONITORING OF PRODUCTS OF CONCEPTION, CARDIAC ELECTRICAL ACTIVITY, VIA NATURAL OR ARTIFICIAL OPENING: ICD-10-PCS | Performed by: SPECIALIST

## 2020-03-16 PROCEDURE — 85025 COMPLETE CBC W/AUTO DIFF WBC: CPT

## 2020-03-16 PROCEDURE — 86592 SYPHILIS TEST NON-TREP QUAL: CPT

## 2020-03-16 PROCEDURE — 74011000258 HC RX REV CODE- 258: Performed by: ADVANCED PRACTICE MIDWIFE

## 2020-03-16 PROCEDURE — 74011250636 HC RX REV CODE- 250/636: Performed by: ANESTHESIOLOGY

## 2020-03-16 PROCEDURE — 87491 CHLMYD TRACH DNA AMP PROBE: CPT

## 2020-03-16 PROCEDURE — 87591 N.GONORRHOEAE DNA AMP PROB: CPT

## 2020-03-16 PROCEDURE — 75410000003 HC RECOV DEL/VAG/CSECN EA 0.5 HR

## 2020-03-16 PROCEDURE — 86803 HEPATITIS C AB TEST: CPT

## 2020-03-16 PROCEDURE — A4300 CATH IMPL VASC ACCESS PORTAL: HCPCS

## 2020-03-16 PROCEDURE — 87389 HIV-1 AG W/HIV-1&-2 AB AG IA: CPT

## 2020-03-16 PROCEDURE — 87798 DETECT AGENT NOS DNA AMP: CPT

## 2020-03-16 PROCEDURE — 00HU33Z INSERTION OF INFUSION DEVICE INTO SPINAL CANAL, PERCUTANEOUS APPROACH: ICD-10-PCS | Performed by: ANESTHESIOLOGY

## 2020-03-16 PROCEDURE — 76060000078 HC EPIDURAL ANESTHESIA

## 2020-03-16 PROCEDURE — 75410000000 HC DELIVERY VAGINAL/SINGLE

## 2020-03-16 PROCEDURE — 87081 CULTURE SCREEN ONLY: CPT

## 2020-03-16 PROCEDURE — 86762 RUBELLA ANTIBODY: CPT

## 2020-03-16 PROCEDURE — 3E033VJ INTRODUCTION OF OTHER HORMONE INTO PERIPHERAL VEIN, PERCUTANEOUS APPROACH: ICD-10-PCS | Performed by: SPECIALIST

## 2020-03-16 PROCEDURE — 74011000250 HC RX REV CODE- 250: Performed by: ANESTHESIOLOGY

## 2020-03-16 PROCEDURE — 77030005513 HC CATH URETH FOL11 MDII -B

## 2020-03-16 PROCEDURE — 87340 HEPATITIS B SURFACE AG IA: CPT

## 2020-03-16 PROCEDURE — 83036 HEMOGLOBIN GLYCOSYLATED A1C: CPT

## 2020-03-16 PROCEDURE — 74011250636 HC RX REV CODE- 250/636: Performed by: SPECIALIST

## 2020-03-16 PROCEDURE — 80053 COMPREHEN METABOLIC PANEL: CPT

## 2020-03-16 PROCEDURE — 77030014125 HC TY EPDRL BBMI -B: Performed by: ANESTHESIOLOGY

## 2020-03-16 PROCEDURE — 86900 BLOOD TYPING SEROLOGIC ABO: CPT

## 2020-03-16 PROCEDURE — 75410000002 HC LABOR FEE PER 1 HR

## 2020-03-16 PROCEDURE — 65410000002 HC RM PRIVATE OB

## 2020-03-16 PROCEDURE — 36415 COLL VENOUS BLD VENIPUNCTURE: CPT

## 2020-03-16 PROCEDURE — 74011250636 HC RX REV CODE- 250/636: Performed by: ADVANCED PRACTICE MIDWIFE

## 2020-03-16 PROCEDURE — 74011250637 HC RX REV CODE- 250/637: Performed by: ADVANCED PRACTICE MIDWIFE

## 2020-03-16 RX ORDER — FENTANYL/BUPIVACAINE/NS/PF 2-1250MCG
1-16 PREFILLED PUMP RESERVOIR EPIDURAL CONTINUOUS
Status: DISCONTINUED | OUTPATIENT
Start: 2020-03-16 | End: 2020-03-17 | Stop reason: HOSPADM

## 2020-03-16 RX ORDER — SODIUM CHLORIDE, SODIUM LACTATE, POTASSIUM CHLORIDE, CALCIUM CHLORIDE 600; 310; 30; 20 MG/100ML; MG/100ML; MG/100ML; MG/100ML
125 INJECTION, SOLUTION INTRAVENOUS CONTINUOUS
Status: DISCONTINUED | OUTPATIENT
Start: 2020-03-16 | End: 2020-03-16

## 2020-03-16 RX ORDER — BUPIVACAINE HYDROCHLORIDE AND EPINEPHRINE 2.5; 5 MG/ML; UG/ML
INJECTION, SOLUTION EPIDURAL; INFILTRATION; INTRACAUDAL; PERINEURAL AS NEEDED
Status: DISCONTINUED | OUTPATIENT
Start: 2020-03-16 | End: 2020-03-17 | Stop reason: HOSPADM

## 2020-03-16 RX ORDER — OXYTOCIN/0.9 % SODIUM CHLORIDE 30/500 ML
0-25 PLASTIC BAG, INJECTION (ML) INTRAVENOUS
Status: DISCONTINUED | OUTPATIENT
Start: 2020-03-16 | End: 2020-03-18 | Stop reason: HOSPADM

## 2020-03-16 RX ORDER — ONDANSETRON 2 MG/ML
4 INJECTION INTRAMUSCULAR; INTRAVENOUS
Status: DISCONTINUED | OUTPATIENT
Start: 2020-03-16 | End: 2020-03-17

## 2020-03-16 RX ORDER — LIDOCAINE HYDROCHLORIDE 10 MG/ML
10 INJECTION INFILTRATION; PERINEURAL ONCE
Status: DISCONTINUED | OUTPATIENT
Start: 2020-03-16 | End: 2020-03-16

## 2020-03-16 RX ORDER — SODIUM CHLORIDE 0.9 % (FLUSH) 0.9 %
5-40 SYRINGE (ML) INJECTION AS NEEDED
Status: DISCONTINUED | OUTPATIENT
Start: 2020-03-16 | End: 2020-03-18 | Stop reason: HOSPADM

## 2020-03-16 RX ORDER — CALCIUM CARBONATE 200(500)MG
400 TABLET,CHEWABLE ORAL
Status: DISCONTINUED | OUTPATIENT
Start: 2020-03-16 | End: 2020-03-17

## 2020-03-16 RX ORDER — ACETAMINOPHEN 325 MG/1
650 TABLET ORAL
Status: DISCONTINUED | OUTPATIENT
Start: 2020-03-16 | End: 2020-03-17

## 2020-03-16 RX ORDER — SODIUM CHLORIDE 0.9 % (FLUSH) 0.9 %
5-40 SYRINGE (ML) INJECTION EVERY 8 HOURS
Status: DISCONTINUED | OUTPATIENT
Start: 2020-03-16 | End: 2020-03-17

## 2020-03-16 RX ORDER — FENTANYL CITRATE 50 UG/ML
INJECTION, SOLUTION INTRAMUSCULAR; INTRAVENOUS AS NEEDED
Status: DISCONTINUED | OUTPATIENT
Start: 2020-03-16 | End: 2020-03-17 | Stop reason: HOSPADM

## 2020-03-16 RX ORDER — SODIUM CHLORIDE 0.9 % (FLUSH) 0.9 %
5-40 SYRINGE (ML) INJECTION EVERY 8 HOURS
Status: DISCONTINUED | OUTPATIENT
Start: 2020-03-16 | End: 2020-03-16

## 2020-03-16 RX ORDER — VALACYCLOVIR HYDROCHLORIDE 500 MG/1
500 TABLET, FILM COATED ORAL EVERY 12 HOURS
Status: DISCONTINUED | OUTPATIENT
Start: 2020-03-16 | End: 2020-03-17

## 2020-03-16 RX ORDER — LIDOCAINE HYDROCHLORIDE 10 MG/ML
10 INJECTION, SOLUTION EPIDURAL; INFILTRATION; INTRACAUDAL; PERINEURAL ONCE
Status: DISCONTINUED | OUTPATIENT
Start: 2020-03-16 | End: 2020-03-17

## 2020-03-16 RX ORDER — PENICILLIN G 3000000 [IU]/50ML
3 INJECTION, SOLUTION INTRAVENOUS EVERY 4 HOURS
Status: DISCONTINUED | OUTPATIENT
Start: 2020-03-16 | End: 2020-03-17

## 2020-03-16 RX ORDER — NALOXONE HYDROCHLORIDE 0.4 MG/ML
0.4 INJECTION, SOLUTION INTRAMUSCULAR; INTRAVENOUS; SUBCUTANEOUS AS NEEDED
Status: DISCONTINUED | OUTPATIENT
Start: 2020-03-16 | End: 2020-03-17

## 2020-03-16 RX ORDER — MAG HYDROX/ALUMINUM HYD/SIMETH 200-200-20
30 SUSPENSION, ORAL (FINAL DOSE FORM) ORAL
Status: DISCONTINUED | OUTPATIENT
Start: 2020-03-16 | End: 2020-03-17

## 2020-03-16 RX ORDER — FENTANYL CITRATE 50 UG/ML
INJECTION, SOLUTION INTRAMUSCULAR; INTRAVENOUS
Status: COMPLETED
Start: 2020-03-16 | End: 2020-03-16

## 2020-03-16 RX ORDER — EPHEDRINE SULFATE/0.9% NACL/PF 50 MG/5 ML
10 SYRINGE (ML) INTRAVENOUS ONCE
Status: COMPLETED | OUTPATIENT
Start: 2020-03-16 | End: 2020-03-16

## 2020-03-16 RX ORDER — NALOXONE HYDROCHLORIDE 0.4 MG/ML
0.4 INJECTION, SOLUTION INTRAMUSCULAR; INTRAVENOUS; SUBCUTANEOUS AS NEEDED
Status: DISCONTINUED | OUTPATIENT
Start: 2020-03-16 | End: 2020-03-16

## 2020-03-16 RX ORDER — BUTORPHANOL TARTRATE 2 MG/ML
1 INJECTION INTRAMUSCULAR; INTRAVENOUS
Status: DISCONTINUED | OUTPATIENT
Start: 2020-03-16 | End: 2020-03-17

## 2020-03-16 RX ORDER — SODIUM CHLORIDE 0.9 % (FLUSH) 0.9 %
5-40 SYRINGE (ML) INJECTION AS NEEDED
Status: DISCONTINUED | OUTPATIENT
Start: 2020-03-16 | End: 2020-03-16

## 2020-03-16 RX ORDER — SODIUM CHLORIDE, SODIUM LACTATE, POTASSIUM CHLORIDE, CALCIUM CHLORIDE 600; 310; 30; 20 MG/100ML; MG/100ML; MG/100ML; MG/100ML
125 INJECTION, SOLUTION INTRAVENOUS CONTINUOUS
Status: DISCONTINUED | OUTPATIENT
Start: 2020-03-16 | End: 2020-03-18 | Stop reason: HOSPADM

## 2020-03-16 RX ORDER — BUPIVACAINE HYDROCHLORIDE AND EPINEPHRINE 2.5; 5 MG/ML; UG/ML
INJECTION, SOLUTION EPIDURAL; INFILTRATION; INTRACAUDAL; PERINEURAL
Status: COMPLETED
Start: 2020-03-16 | End: 2020-03-16

## 2020-03-16 RX ORDER — SODIUM CHLORIDE 0.9 % (FLUSH) 0.9 %
5-40 SYRINGE (ML) INJECTION AS NEEDED
Status: DISCONTINUED | OUTPATIENT
Start: 2020-03-16 | End: 2020-03-17

## 2020-03-16 RX ORDER — MINERAL OIL
120 OIL (ML) ORAL ONCE
Status: DISCONTINUED | OUTPATIENT
Start: 2020-03-16 | End: 2020-03-17

## 2020-03-16 RX ADMIN — BUPIVACAINE HYDROCHLORIDE AND EPINEPHRINE 0.8 ML: 2.5; 5 INJECTION, SOLUTION EPIDURAL; INFILTRATION; INTRACAUDAL; PERINEURAL at 21:35

## 2020-03-16 RX ADMIN — VALACYCLOVIR HYDROCHLORIDE 500 MG: 500 TABLET, FILM COATED ORAL at 21:14

## 2020-03-16 RX ADMIN — SODIUM CHLORIDE 5 MILLION UNITS: 900 INJECTION, SOLUTION INTRAVENOUS at 18:04

## 2020-03-16 RX ADMIN — PENICILLIN G 3 MILLION UNITS: 3000000 INJECTION, SOLUTION INTRAVENOUS at 21:46

## 2020-03-16 RX ADMIN — BUPIVACAINE HYDROCHLORIDE AND EPINEPHRINE 3 ML: 2.5; 5 INJECTION, SOLUTION EPIDURAL; INFILTRATION; INTRACAUDAL; PERINEURAL at 21:36

## 2020-03-16 RX ADMIN — Medication 2 MILLI-UNITS/MIN: at 18:41

## 2020-03-16 RX ADMIN — SODIUM CHLORIDE, SODIUM LACTATE, POTASSIUM CHLORIDE, AND CALCIUM CHLORIDE 125 ML/HR: 600; 310; 30; 20 INJECTION, SOLUTION INTRAVENOUS at 18:05

## 2020-03-16 RX ADMIN — MISOPROSTOL 800 MCG: 200 TABLET ORAL at 23:47

## 2020-03-16 RX ADMIN — Medication 10 MG: at 22:16

## 2020-03-16 RX ADMIN — FENTANYL CITRATE 100 MCG: 50 INJECTION, SOLUTION INTRAMUSCULAR; INTRAVENOUS at 21:37

## 2020-03-16 RX ADMIN — BUPIVACAINE HYDROCHLORIDE AND EPINEPHRINE 3 ML: 2.5; 5 INJECTION, SOLUTION EPIDURAL; INFILTRATION; INTRACAUDAL; PERINEURAL at 21:37

## 2020-03-16 RX ADMIN — Medication 12 ML/HR: at 21:51

## 2020-03-16 RX ADMIN — SODIUM CHLORIDE, SODIUM LACTATE, POTASSIUM CHLORIDE, AND CALCIUM CHLORIDE 999 ML/HR: 600; 310; 30; 20 INJECTION, SOLUTION INTRAVENOUS at 21:13

## 2020-03-16 NOTE — LACTATION NOTE
Discussed with mother her plan for feeding. Reviewed the benefits of exclusive breast milk feeding during the hospital stay. Informed mother of the risks of using formula to supplement in the first few days of life as well as the benefits of successful breast milk feeding. Mother acknowledges understanding of information reviewed and states that it is her plan to formula feed exclusively her infant. Will support her choice and offer additional information as needed.

## 2020-03-16 NOTE — H&P
History & Physical    Name: Wayne Granda MRN: [id number]  SSN: [id number]    YOB: 1985  Age: 29 y.o. Sex: female        Subjective:     Estimated Date of Delivery: 3/17/20  OB History    Para Term  AB Living   8 7 6 1   7   SAB TAB Ectopic Molar Multiple Live Births           0 7      # Outcome Date GA Lbr Rian/2nd Weight Sex Delivery Anes PTL Lv   8 Current            7 Term 17 39w1d 01:54 / 00:04 4.065 kg M Vag-Spont None N ELTON   6 Term 12/31/15 38w2d / 00:12 3.32 kg M VAGINAL DELI EPIDURAL AN N [place]   5 Term 01/15/14 39w1d 05:15 / 00:08 4.32 kg M VAGINAL DELI  N [place]   4 Term 12 37w4d 18:55 / 00:14 3.25 kg F VAGINAL DELI EPIDURAL AN N [place]   3 Term 12/02/10        ELTON   2 Term 07 38w0d   F VAGINAL DELI   ELTON   1  05 33w0d   M VAGINAL DELI None  ELTON       Ms. Lauren Lopez is admitted with pregnancy at 39w6d for induction of labor. Prenatal course was complicated by no prenatal care. Pt reports uneventful pregnancy and lack of care d/t no insurance. Previous pregnancies were all vaginal.  Please see prenatal records for details. Past Medical History:   Diagnosis Date    Abnormal Pap smear     HPV/ ASCUS    Anemia     with this pregnancy    Anemia NEC     Chlamydia     treated 2013    Gonorrhea     unknown by pt    HX OTHER MEDICAL      H/O UTI prior to this pregnancy     History reviewed. No pertinent surgical history.   Social History     Occupational History    Not on file   Tobacco Use    Smoking status: Never Smoker    Smokeless tobacco: Never Used   Substance and Sexual Activity    Alcohol use: No    Drug use: No    Sexual activity: Yes     Partners: Male     Family History   Problem Relation Age of Onset   [facility] Stroke Mother     Hypertension Mother     Asthma Sister     Diabetes Maternal Uncle     Cancer Maternal Grandmother     Hypertension Maternal Grandmother     Cancer Maternal Grandfather     Diabetes Father     Diabetes Maternal Aunt        No Known Allergies  Prior to Admission medications    Medication Sig Start Date End Date Taking? Authorizing Provider   ibuprofen (MOTRIN) 800 mg tablet Take 1 Tab by mouth every eight (8) hours. 3/2/17   Malvin Gruber MD   oxyCODONE-acetaminophen (PERCOCET 7.5) 7.5-325 mg per tablet Take 1 Tab by mouth every four (4) hours as needed. Max Daily Amount: 6 Tabs. 3/2/17   Malvin Gruber MD   zolpidem (AMBIEN) 10 mg tablet Take 10 mg by mouth nightly as needed for Sleep. Indications: SLEEP-ONSET INSOMNIA    Provider, Historical   pediatric multivitamins (FLINTSTONES MULTIVITAMIN) chewable tablet Take 1 Tab by mouth daily. Other, MD Benedict        Review of Systems: A comprehensive review of systems was negative. Objective:     Vitals: There were no vitals filed for this visit. Physical Exam:  Patient without distress.   Heart: Regular rate and rhythm or S1S2 present  Lung: clear to auscultation throughout lung fields, no wheezes, no rales, no rhonchi and normal respiratory effort  Abdomen: soft, nontender  Fundus: soft and non tender  Perineum: blood absent, amniotic fluid absent  Cervical Exam: 3 cm dilated    40% effaced    -2 station    Presenting Part: cephalic  Cervical Position: posterior  Consistency: Soft  Membranes:  Intact  Fetal Heart Rate: Reactive    Prenatal Labs:   Lab Results   Component Value Date/Time    ABO/Rh(D) O POSITIVE 02/27/2017 05:46 PM    Rubella, External NonImmune 11/03/2016    GrBStrep, External Positive 02/07/2017    HBsAg, External Negative 11/03/2016    HIV, External NonReactive 11/03/2016    RPR, External NonReactive 11/03/2016    Gonorrhea, External Negative 11/03/2016    Chlamydia, External Negative 11/03/2016    ABO,Rh O Positive 11/03/2016         Assessment/Plan:     Active Problems:    [address] multipara (3/16/2020)      Encounter for elective induction of labor (3/16/2020)         Plan: Admit for Reassuring fetal status, Continue plan for vaginal delivery, begin labor induction. .  Group B Strep was not tested, will treat with PCN. Will also draw all prenatal labs, and STD swabs obtained at office today.

## 2020-03-16 NOTE — LACTATION NOTE
Discussed with mother her plan for feeding. Reviewed the benefits of exclusive breast milk feeding during the hospital stay. Informed mother of the risks of using formula to supplement in the first few days of life as well as the benefits of successful breast milk feeding. Mother acknowledges understanding of information reviewed and states that it is her plan to breast milk feed exclusively her infant. Will support her choice and offer additional information as needed.

## 2020-03-16 NOTE — PROGRESS NOTES
Pt of Dr. Dong Thakkar PANCHO 3/17/2020, arrives to L&D for induction for dates. Pt received no prenatal care until today. Pt was 2cm in office today. Pt denies vaginal bleeding, discharge, or LOF. Pt reports positive fetal movement. Pt reports all previous vaginal deliveries with no complications with deliveries. 1650: Veena Malcolm NP at bedside to discuss plan of care with patient at this time. 1715: Called lab to inquire what tubes and swabs are needed at this time. Lab will call back. 1920: Off going SBAR report given to Brice Marroquin RN.

## 2020-03-17 LAB — RPR SER QL: NONREACTIVE

## 2020-03-17 PROCEDURE — 65410000002 HC RM PRIVATE OB

## 2020-03-17 PROCEDURE — 77030021125

## 2020-03-17 PROCEDURE — 74011250637 HC RX REV CODE- 250/637: Performed by: ADVANCED PRACTICE MIDWIFE

## 2020-03-17 RX ORDER — OXYCODONE AND ACETAMINOPHEN 5; 325 MG/1; MG/1
1 TABLET ORAL
Status: DISCONTINUED | OUTPATIENT
Start: 2020-03-17 | End: 2020-03-18 | Stop reason: HOSPADM

## 2020-03-17 RX ORDER — HYDROCORTISONE ACETATE PRAMOXINE HCL 2.5; 1 G/100G; G/100G
CREAM TOPICAL AS NEEDED
Status: DISCONTINUED | OUTPATIENT
Start: 2020-03-17 | End: 2020-03-18 | Stop reason: HOSPADM

## 2020-03-17 RX ORDER — ACETAMINOPHEN 325 MG/1
650 TABLET ORAL
Status: DISCONTINUED | OUTPATIENT
Start: 2020-03-17 | End: 2020-03-18 | Stop reason: HOSPADM

## 2020-03-17 RX ORDER — MISOPROSTOL 200 UG/1
800 TABLET ORAL ONCE
Status: COMPLETED | OUTPATIENT
Start: 2020-03-17 | End: 2020-03-16

## 2020-03-17 RX ORDER — OXYTOCIN/0.9 % SODIUM CHLORIDE 30/500 ML
42-168 PLASTIC BAG, INJECTION (ML) INTRAVENOUS ONCE
Status: ACTIVE | OUTPATIENT
Start: 2020-03-17 | End: 2020-03-17

## 2020-03-17 RX ORDER — METHYLERGONOVINE MALEATE 0.2 MG/ML
0.2 INJECTION INTRAVENOUS ONCE
Status: ACTIVE | OUTPATIENT
Start: 2020-03-17 | End: 2020-03-17

## 2020-03-17 RX ORDER — OXYTOCIN/RINGER'S LACTATE 20/1000 ML
125-500 PLASTIC BAG, INJECTION (ML) INTRAVENOUS ONCE
Status: DISCONTINUED | OUTPATIENT
Start: 2020-03-17 | End: 2020-03-17

## 2020-03-17 RX ORDER — EPHEDRINE SULFATE/0.9% NACL/PF 50 MG/5 ML
SYRINGE (ML) INTRAVENOUS
Status: DISPENSED
Start: 2020-03-17 | End: 2020-03-17

## 2020-03-17 RX ORDER — DIPHENHYDRAMINE HCL 25 MG
25 CAPSULE ORAL
Status: DISCONTINUED | OUTPATIENT
Start: 2020-03-17 | End: 2020-03-18 | Stop reason: HOSPADM

## 2020-03-17 RX ORDER — IBUPROFEN 400 MG/1
800 TABLET ORAL EVERY 8 HOURS
Status: DISCONTINUED | OUTPATIENT
Start: 2020-03-17 | End: 2020-03-18 | Stop reason: HOSPADM

## 2020-03-17 RX ORDER — NALOXONE HYDROCHLORIDE 0.4 MG/ML
0.4 INJECTION, SOLUTION INTRAMUSCULAR; INTRAVENOUS; SUBCUTANEOUS AS NEEDED
Status: DISCONTINUED | OUTPATIENT
Start: 2020-03-17 | End: 2020-03-18 | Stop reason: HOSPADM

## 2020-03-17 RX ORDER — DOCUSATE SODIUM 100 MG/1
100 CAPSULE, LIQUID FILLED ORAL
Status: DISCONTINUED | OUTPATIENT
Start: 2020-03-17 | End: 2020-03-18 | Stop reason: HOSPADM

## 2020-03-17 RX ADMIN — ACETAMINOPHEN 650 MG: 325 TABLET ORAL at 20:56

## 2020-03-17 RX ADMIN — IBUPROFEN 800 MG: 400 TABLET, FILM COATED ORAL at 17:18

## 2020-03-17 RX ADMIN — IBUPROFEN 800 MG: 400 TABLET, FILM COATED ORAL at 10:01

## 2020-03-17 RX ADMIN — IBUPROFEN 800 MG: 400 TABLET, FILM COATED ORAL at 02:14

## 2020-03-17 NOTE — ANESTHESIA PROCEDURE NOTES
Epidural Block    Start time: 3/16/2020 9:30 PM  End time: 3/16/2020 9:39 PM  Performed by: Shreya Ramirez MD  Authorized by: Shreya Ramirez MD     Pre-Procedure  Indication: labor epidural    Preanesthetic Checklist: risks and benefits discussed, site marked and timeout performed    Timeout Time: 21:30        Epidural:   Patient position:  Seated  Prep region:  Lumbar  Prep: DuraPrep    Location:  L3-4    Needle and Epidural Catheter:   Needle Type:  Tuohy  Needle Gauge:  17 G  Injection Technique:  Loss of resistance using saline  Attempts:  1  Catheter Size:  19 G  Catheter at Skin Depth (cm):  9  Depth in Epidural Space (cm):  4  Events: no blood with aspiration, no cerebrospinal fluid with aspiration, no paresthesia and negative aspiration test    Test Dose:  Bupivacaine 0.25% w/ epi and negative    Assessment:   Catheter Secured:  Tegaderm and tape  Insertion:  Uncomplicated  Patient tolerance:  Patient tolerated the procedure well with no immediate complications  Spinal portion:    A 25 g pencil point spinal needle was placed through the Touhy x1 attempt until CSF was obtained. 0.8 mL 0.25% bupivacaine with 1:200K epinephrine was deposited into the CSF. -paresthesia.

## 2020-03-17 NOTE — PROGRESS NOTES
: Bedside shift change report given to Eric Duran (oncoming nurse) by CALIN Mansfield RN (offgoing nurse). Report included the following information SBAR.    : Dr. Darío Nunez at the bedside to place epidural.  Pt sitting up on side of bed. : Epidural placed by Dr. Darío Nunez without difficulty. Pt repositioned in bed.     :  of live baby boy by ALONZO Merchant RN (student CNM) and Jordan Miller CNM.    2797: Bedside shift change report given to ALONZO Vega (oncoming nurse) by Tank Gonzales RN (offgoing nurse). Report included the following information SBAR.

## 2020-03-17 NOTE — PROGRESS NOTES
Delivery Note    Obstetrician:  Brittany Coreas    Assistant: Kathy Del Cid CNM    Pre-Delivery Diagnosis: Term pregnancy, Induced labor and Single fetus    Post-Delivery Diagnosis: Male    Intrapartum Event: None    Procedure: Spontaneous vaginal delivery    Epidural: YES    Monitor:  Fetal Heart Tones - External and Uterine Contractions - External    Indications for instrumental delivery: none    Estimated Blood Loss: 250    Episiotomy: none    Laceration(s):  none    Laceration(s) repair: NO    Presentation: Cephalic    Fetal Description: rodríguez    Fetal Position: Left Occiput Anterior    Birth Weight: 8lb 8oz    Birth Length: 20.5in    Apgar - One Minute: 8    Apgar - Five Minutes: 9    Umbilical Cord: Nuchal Cord x  1, 3 vessels present and Cord blood sent to lab for type, Rh, and Orestes' test  Specimens: none  Complications:  None    Head delivered in OA position and restituted to LOT, shoulders were delivered without incident, followed by rest of body, placed on maternal abd and cord clamping was delayed for 1 minute. Placenta delivered, intact, and discarded. Left room with mother and infant bonding in a stable condition.             Cord Blood Results:   Information for the patient's :  Vaishali Stearns, Pending [[id number]]   No results found for: PCTABR, PCTDIG, BILI, ABORH    Prenatal Labs:     Lab Results   Component Value Date/Time    ABO/Rh(D) O POSITIVE 2020 04:51 PM    HBsAg, External Negative 2016    HIV, External NonReactive 2016    Rubella, External NonImmune 2016    RPR, External NonReactive 2016    Gonorrhea, External Negative 2016    Chlamydia, External Negative 2016    GrBStrep, External Positive 2017        Attending Attestation: I was present and scrubbed for the entire procedure

## 2020-03-17 NOTE — PROGRESS NOTES
Labor Progress Note  Patient seen, fetal heart rate and contraction pattern evaluated, patient examined.   Patient Vitals for the past 1 hrs:   BP Temp Pulse Resp SpO2   03/16/20 2253     100 %   03/16/20 2248     100 %   03/16/20 2245 97/52 98.5 °F (36.9 °C) 73 18 100 %   03/16/20 2243     100 %   03/16/20 2238     100 %   03/16/20 2233     100 %   03/16/20 2230 115/67  86     03/16/20 2228     99 %   03/16/20 2218     100 %   03/16/20 2215 107/66  86     03/16/20 2213     100 %   03/16/20 2208     100 %   03/16/20 2203     100 %   03/16/20 2159 116/64  80         Physical Exam:  Cervical Exam:  4 cm dilated    50% effaced    -1 station    Membranes:  Intact  Uterine Activity: Frequency: Every 3 minutes  Fetal Heart Rate: Reactive    Assessment/Plan:  Reassuring fetal status, continue plan for vaginal delivery

## 2020-03-17 NOTE — PROGRESS NOTES
CM acknowledged consult. CM will f/u with MOC at bedside to make contact, introduce role, and complete initiall assessment. CM will report updates as they become available.     Karyn Baez, MSW  Care Manager, [facility]

## 2020-03-17 NOTE — PROGRESS NOTES
Post-Partum Day Number 1 Progress Note    Patient doing well post-partum without significant complaint. Voiding withour difficulty, normal lochia. Vitals:  No data found. Temp (24hrs), Av.4 °F (36.9 °C), Min:98.1 °F (36.7 °C), Max:98.8 °F (37.1 °C)      Vital signs stable, afebrile. Exam:  Patient without distress. Abdomen soft, fundus firm at level of umbilicus, nontender               Lower extremities are negative for swelling, cords or tenderness. Labs:   Recent Results (from the past 24 hour(s))   CBC WITH AUTOMATED DIFF    Collection Time: 20  4:51 PM   Result Value Ref Range    WBC 10.4 3.6 - 11.0 K/uL    RBC 3.40 (L) 3.80 - 5.20 M/uL    HGB 9.4 (L) 11.5 - 16.0 g/dL    HCT 29.3 (L) 35.0 - 47.0 %    MCV 86.2 80.0 - 99.0 FL    MCH 27.6 26.0 - 34.0 PG    MCHC 32.1 30.0 - 36.5 g/dL    RDW 14.6 (H) 11.5 - 14.5 %    PLATELET 863 725 - 974 K/uL    MPV 13.0 (H) 8.9 - 12.9 FL    NRBC 0.0 0  WBC    ABSOLUTE NRBC 0.00 0.00 - 0.01 K/uL    NEUTROPHILS 78 (H) 32 - 75 %    LYMPHOCYTES 13 12 - 49 %    MONOCYTES 7 5 - 13 %    EOSINOPHILS 1 0 - 7 %    BASOPHILS 0 0 - 1 %    IMMATURE GRANULOCYTES 1 (H) 0.0 - 0.5 %    ABS. NEUTROPHILS 8.2 (H) 1.8 - 8.0 K/UL    ABS. LYMPHOCYTES 1.3 0.8 - 3.5 K/UL    ABS. MONOCYTES 0.7 0.0 - 1.0 K/UL    ABS. EOSINOPHILS 0.1 0.0 - 0.4 K/UL    ABS. BASOPHILS 0.0 0.0 - 0.1 K/UL    ABS. IMM.  GRANS. 0.1 (H) 0.00 - 0.04 K/UL    DF AUTOMATED     TYPE & SCREEN    Collection Time: 20  4:51 PM   Result Value Ref Range    Crossmatch Expiration 2020     ABO/Rh(D) O POSITIVE     Antibody screen NEG    METABOLIC PANEL, COMPREHENSIVE    Collection Time: 20  5:35 PM   Result Value Ref Range    Sodium 136 136 - 145 mmol/L    Potassium 3.5 3.5 - 5.1 mmol/L    Chloride 108 97 - 108 mmol/L    CO2 22 21 - 32 mmol/L    Anion gap 6 5 - 15 mmol/L    Glucose 68 65 - 100 mg/dL    BUN 10 6 - 20 MG/DL    Creatinine 0.64 0.55 - 1.02 MG/DL BUN/Creatinine ratio 16 12 - 20      GFR est AA >60 >60 ml/min/1.73m2    GFR est non-AA >60 >60 ml/min/1.73m2    Calcium 9.0 8.5 - 10.1 MG/DL    Bilirubin, total 0.4 0.2 - 1.0 MG/DL    ALT (SGPT) 23 12 - 78 U/L    AST (SGOT) 19 15 - 37 U/L    Alk. phosphatase 159 (H) 45 - 117 U/L    Protein, total 7.7 6.4 - 8.2 g/dL    Albumin 2.9 (L) 3.5 - 5.0 g/dL    Globulin 4.8 (H) 2.0 - 4.0 g/dL    A-G Ratio 0.6 (L) 1.1 - 2.2     HEP B SURFACE AG    Collection Time: 03/16/20  5:35 PM   Result Value Ref Range    Hepatitis B surface Ag <0.10 Index    Hep B surface Ag Interp. NEGATIVE  NEG     HEPATITIS C AB    Collection Time: 03/16/20  5:35 PM   Result Value Ref Range    Hep C  virus Ab Interp. NONREACTIVE NR      Hep C  virus Ab comment Method used is Siemens Advia [address]     HIV-1,2 P24 AG/AB SCREEN    Collection Time: 03/16/20  5:35 PM   Result Value Ref Range    p24 Antigen NONREACTIVE NR      HIV-1,2 Ab NONREACTIVE NR     RUBELLA AB, IGG    Collection Time: 03/16/20  5:35 PM   Result Value Ref Range    Rubella, IgG Qt. 7.50 IU/ML    Rubella IgG, QL EQUIVOCAL     HEMOGLOBIN A1C WITH EAG    Collection Time: 03/16/20  5:35 PM   Result Value Ref Range    Hemoglobin A1c 5.7 (H) 4.0 - 5.6 %    Est. average glucose 117 mg/dL   CULTURE, GENITAL GROUP B STREP    Collection Time: 03/16/20  5:35 PM   Result Value Ref Range    Special Requests: NO SPECIAL REQUESTS      Culture result: NO GROUP B BETA STREPTOCOCCUS ISOLATED SO FAR         Assessment and Plan:  Patient appears to be having uncomplicated post-partum course. Continue routine perineal care and maternal education. Plan discharge tomorrow if no problems occur.

## 2020-03-17 NOTE — ANESTHESIA PREPROCEDURE EVALUATION
Anesthetic History   No history of anesthetic complications            Review of Systems / Medical History  Patient summary reviewed, nursing notes reviewed and pertinent labs reviewed    Pulmonary  Within defined limits                 Neuro/Psych   Within defined limits           Cardiovascular    Hypertension              Exercise tolerance: >4 METS     GI/Hepatic/Renal  Within defined limits              Endo/Other  Within defined limits           Other Findings   Comments: IUP  Grand multipara    H/O Gonorrhea  H/O Chlamydia  HPV/ASCUS           Physical Exam    Airway  Mallampati: II  TM Distance: 4 - 6 cm  Neck ROM: normal range of motion   Mouth opening: Normal     Cardiovascular  Regular rate and rhythm,  S1 and S2 normal,  no murmur, click, rub, or gallop             Dental  No notable dental hx       Pulmonary  Breath sounds clear to auscultation               Abdominal  GI exam deferred       Other Findings            Anesthetic Plan    ASA: 2  Anesthesia type: CSE            Anesthetic plan and risks discussed with: Patient

## 2020-03-18 VITALS
RESPIRATION RATE: 18 BRPM | BODY MASS INDEX: 30.12 KG/M2 | OXYGEN SATURATION: 100 % | SYSTOLIC BLOOD PRESSURE: 128 MMHG | HEART RATE: 74 BPM | WEIGHT: 170 LBS | HEIGHT: 63 IN | DIASTOLIC BLOOD PRESSURE: 80 MMHG | TEMPERATURE: 97.8 F

## 2020-03-18 LAB
C TRACH DNA SPEC QL NAA+PROBE: POSITIVE
N GONORRHOEA DNA SPEC QL NAA+PROBE: NEGATIVE
SAMPLE TYPE: ABNORMAL
SAMPLE TYPE: NORMAL
SERVICE CMNT-IMP: ABNORMAL
SERVICE CMNT-IMP: NORMAL
SPECIMEN SOURCE: ABNORMAL
SPECIMEN SOURCE: NORMAL

## 2020-03-18 PROCEDURE — 74011250637 HC RX REV CODE- 250/637: Performed by: ADVANCED PRACTICE MIDWIFE

## 2020-03-18 RX ORDER — IBUPROFEN 800 MG/1
800 TABLET ORAL EVERY 8 HOURS
Qty: 30 TAB | Refills: 0 | Status: SHIPPED | OUTPATIENT
Start: 2020-03-18

## 2020-03-18 RX ORDER — OXYCODONE AND ACETAMINOPHEN 5; 325 MG/1; MG/1
1 TABLET ORAL
Qty: 10 TAB | Refills: 0 | Status: SHIPPED | OUTPATIENT
Start: 2020-03-18 | End: 2020-03-21

## 2020-03-18 RX ORDER — DOCUSATE SODIUM 100 MG/1
100 CAPSULE, LIQUID FILLED ORAL
Qty: 60 CAP | Refills: 0 | Status: SHIPPED | OUTPATIENT
Start: 2020-03-18 | End: 2020-06-16

## 2020-03-18 RX ADMIN — IBUPROFEN 800 MG: 400 TABLET, FILM COATED ORAL at 04:06

## 2020-03-18 RX ADMIN — IBUPROFEN 800 MG: 400 TABLET, FILM COATED ORAL at 11:51

## 2020-03-18 NOTE — PROGRESS NOTES
Post-Partum Day Number 2 Progress/Discharge Note    Patient doing well post-partum without significant complaint. Voiding without difficulty, normal lochia, positive flatus. Vitals:    Patient Vitals for the past 8 hrs:   BP Temp Pulse Resp   20 0730 128/80 97.8 °F (36.6 °C) 74 18     Temp (24hrs), Av.9 °F (36.6 °C), Min:97.4 °F (36.3 °C), Max:98.2 °F (36.8 °C)      Vital signs stable, afebrile. Exam:  Patient without distress. Abdomen soft, fundus firm at level of umbilicus, non tender               Perineum with normal lochia noted. Lower extremities are negative for swelling, cords or tenderness. Lab/Data Review: All lab results for the last 24 hours reviewed. Assessment and Plan:  Patient appears to be having uncomplicated post-partum course. Continue routine perineal care and maternal education. Plan discharge for today with follow up in our office in 6 weeks.

## 2020-03-18 NOTE — PROGRESS NOTES
Initial Assessment:     CM met with MOC (Silvia Natalia, : 85) and FOC [place], : 86) at bedside to make contact, introduce role, and complete initial assessment. MOC verified demographic information and reported she lives at address on file with the [address] [place] [address] Road, their seven children, and MOC's uncle who serves as their \"live-in . \" MOC reported she has four sons (ages 15, 10, 3 & 1) and three daughters (ages 15, 5, & 9). Both MOC & FOC identified familial support if needs arise. MOC reported receiving no pre-maria de jesus care throughout duration of pregnancy due to her inability to secure Medicaid coverage. MOC reported she attempted to secure Medicaid coverage \"multiple times\" but felt as though she was \"getting the run around\" from [address] 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 MO reported she did not have all of the supporting documentation that was required to process her application. MOC also reported that her  through DSS was not accessible to answer her questions over the phone. MOC reported that she utilized supportive services through the Health Department when she \"needed to check on the baby. \" MOC reported she has successfully secured Medicaid coverage and hasn't experienced any barriers accessing medical treatment since. [facility] reported that she plans to make an appointment through [address] to [facility]. [facility] reported having access to necessities for baby including but not limited to a car seat for d/c. [facility] actively drives and identified no barriers related to transportation. [facility] reported baby's name as Radha Romeo, : 3/16/2020. [facility] reported baby was delivered vaginally at 36 weeks gestation. [facility] reported baby weighed 8 pounds, 8 ounces at birth. [facility] plans to bottle feed baby. [facility] reported baby's PCP will be provided by Dr. Jaquan Green who is associated with [facility].     [facility] reported prior hx of CPS involvement with her 15 y.o daughter. MOC reported paternal grandmother made complaints because she \"selfishly wanted custody. \" MOC reported CPS signed off on case. MOC reported no prior hx of recreational drug use. CM inquired if MOC or FOC had any questions, concerns, or d/c needs; both parties declined. MOC & FOC were appropriate and engaged throughout duration of initial assessment; no immediate concerns identified. CM will remain available for consult if additional needs arise before d/c. Consult completed.     Aly Duarte, MSW  Care Manager, [facility]

## 2020-03-18 NOTE — DISCHARGE INSTRUCTIONS
Vaginal Childbirth (Postpartum Care): After Your Visit     Your Care Instructions    After childbirth (postpartum period), your body goes through many changes. Some of these changes happen over several weeks. It is easy to get too tired and overwhelmed during the first weeks after childbirth. Take it easy on yourself. You may find it hard to meet the extra demands on your energy and time. Get rest whenever you can, accept help from others, and eat well and drink plenty of fluids. Your body will slowly heal in the next few weeks. You may feel emotional during this time. Changes in your hormones can shift your mood without warning. No heavy lifting. No more than 8 lbs or the size of baby for 2-3 weeks. Avoid stairs. Limit to 1-2 times per day for the 1st week after delivery. No driving for the first week after delivery. Follow-up care is a key part of your treatment and safety. Be sure to make and go to all appointments, and call your doctor if you are having problems. Its also a good idea to know your test results and keep a list of the medicines you take. Around 4 to 6 weeks after your baby's birth, you will have a follow-up visit with your doctor. This visit is your time to talk to your doctor about anything you are concerned or curious about. Keep a list of questions to bring to your postpartum visit. Your questions might be about:  Changes in your breasts, such as lumps or soreness. When to expect your menstrual period to start again. What form of birth control is best for you. Weight you have put on during the pregnancy. Exercise options. What foods and drinks are best for you, especially if you are breast-feeding. Problems you might be having with breast-feeding. When you can have sex. Some women may want to talk about lubricants for the vagina. Any feelings of sadness or restlessness that you are having. How can you care for yourself at home?   Vaginal bleeding and cramps  After delivery, you will have a bloody discharge from the vagina. This will turn pink within a week and then white or yellow after about 10 days. It may last for 2 to 4 weeks or longer, until the uterus has healed. Do not rinse inside your vagina with fluids (douche). Do not worry if you pass some blood clots, as long as they are smaller than a golf ball. If you have a tear or stitches in your vaginal area, change the pad at least every 4 hours to prevent soreness and infection. You may have cramps for the first few days after childbirth. These are normal and occur as the uterus shrinks to normal size. Take an over-the-counter pain medicine, such as acetaminophen (Tylenol), ibuprofen (Advil, Motrin), or naproxen (Aleve), for cramps. Read and follow all instructions on the label. Do not take aspirin, because it can cause more bleeding. Do not take two or more pain medicines at the same time unless the doctor told you to. Many pain medicines have acetaminophen, which is Tylenol. Too much acetaminophen (Tylenol) can be harmful. Stitches  If you have stitches, they will dissolve on their own and do not need to be removed. Follow your doctor's instructions for cleaning the stitched area. Put ice or a cold pack on your painful area for 10 to 20 minutes at a time. , several times a day, for the first few days. Put a thin cloth between the ice and your skin. Sit in a few inches of warm water (sitz bath) 3 times a day and after bowel movements. The warm water helps with pain and itching. If you do not have a tub, a warm shower might help. Keep the area clean by pouring or spraying warm water over the area outside your vagina and anus after you use the toilet. Breast fullness  Your breasts may overfill (engorge) in the first few days after delivery. To help milk flow and to relieve pain, warm your breasts in the shower or by using warm, moist towels before nursing.   If you are not nursing, do not put warmth on your breasts or touch your breasts. Wear a tight bra or sports bra and use ice until the fullness goes away. This usually takes 2 to 3 days. Put ice or a cold pack on your breast after nursing to reduce swelling and pain. Put a thin cloth between the ice and your skin. Activity  Eat a balanced diet. Do not try to lose weight by cutting calories. Keep taking your prenatal vitamins, or take a multivitamin. ·  Get help with household chores from family or friends, if you can. Do not try to do it all yourself. Get as much rest as you can. Try to take naps when your baby sleeps during the day. Get some exercise every day. But do not do any heavy exercise until your doctor says it is okay. Do not have sex or use tampons until you have stopped bleeding and at least 4 to 6 weeks have passed since you gave birth. Talk to your doctor about birth control. You can get pregnant even before your period returns. Also, you can get pregnant while you are breast-feeding. Mental health  It is normal to have some sadness, anxiety, sleeplessness, and mood swings after you go home. If you feel upset or hopeless for more than a few days or are having trouble doing the things you need to do, talk to your doctor. · Many women get the \"baby blues\" during the first few days after childbirth. The \"baby blues\" usually peak around the fourth day and then ease up in less than 2 weeks. If you have the \"baby blues\" for more than a few days, or if you have thoughts of hurting yourself or your baby, call your doctor right away. Constipation and hemorrhoids  Drink plenty of fluids, enough so that your urine is light yellow or clear like water. If you have kidney, heart, or liver disease and have to limit fluids, talk with your doctor before you increase the amount of fluids you drink. Eat plenty of fiber each day to avoid constipation.  Include foods such as whole-grain breads and cereals, raw vegetables, raw and dried fruits, and beans.  · If you have hemorrhoids or swelling or pain around the opening of your vagina, try using cold and heat. You can put ice or a cold pack on the area for 10 to 20 minutes at a time. Put a thin cloth between the ice and your skin. Also try sitting in a few inches of warm water (sitz bath) 3 times a day and after bowel movements. When should you call for help? Call 911 anytime you think you may need emergency care. For example, call if:  You are thinking of hurting yourself, your baby, or anyone else. You have sudden, severe pain in your belly. You passed out (lost consciousness). Call your doctor now or seek immediate medical care if:  You have severe vaginal bleeding. You are passing blood clots and soaking through a pad each hour for 2 or more hours. Your vaginal bleeding seems to be getting heavier or is still bright red 4 days after delivery, or you pass blood clots larger than the size of a golf ball. You are dizzy or lightheaded, or you feel like you may faint. You are vomiting or cannot keep fluids down. You have a fever. You have new or more belly pain. You pass tissue (not just blood). Your vaginal discharge smells bad. Your belly feels tender or full and hard. Your breasts are continuously painful or red. You feel sad, anxious, or hopeless for more than a few days. Watch closely for changes in your health, and be sure to contact your doctor if you have any problems. Where can you learn more? Go to [email or web address]    Enter G889  in the search box to learn more about \"Postpartum Care: After Your Visit\". or    Go to [email or web address]    Enter Q237  in the search box to learn more about \"Vaginal Childbirth: After Your Visit\". © 0489-8415 Healthwise, Incorporated. Care instructions adapted under license by [facility] (which disclaims liability or warranty for this information).  This care instruction is for use with your licensed healthcare professional. If you have questions about a medical condition or this instruction, always ask your healthcare professional. Tenny Ohm any warranty or liability for your use of this information. Follow up with [facility] in 1 month; call to schedule appointment.

## 2020-03-19 LAB
BACTERIA SPEC CULT: NORMAL
SERVICE CMNT-IMP: NORMAL

## 2020-03-20 NOTE — DISCHARGE SUMMARY
Obstetrical Discharge Summary     Name: Sintia Richardson MRN: [id number]  SSN: [id number]    YOB: 1985  Age: 29 y.o. Sex: female      Allergies: Patient has no known allergies. Admit Date: 3/16/2020    Discharge Date: 3/20/2020     Admitting Physician: Rey Gregory MD     Attending Physician:  No att. providers found     * Admission Diagnoses: Encounter for elective induction of labor [Z34.90]  [address] multipara [Z64.1]    * Discharge Diagnoses:   Information for the patient's :  Arlin Campos [[id number]]   Delivery of a 3.855 kg male infant via Vaginal, Spontaneous on 3/16/2020 at 11:43 PM  by Ethelene Naval. Apgars were 9  and 9 . Additional Diagnoses:   Hospital Problems as of 3/18/2020 Date Reviewed: 3/16/2020          Codes Class Noted - Resolved POA    Grand multipara ICD-10-CM: Z64.1  ICD-9-CM: V61.5  3/16/2020 - Present Unknown        Encounter for elective induction of labor ICD-10-CM: Z34.90  ICD-9-CM: V22.1  3/16/2020 - Present Unknown             Lab Results   Component Value Date/Time    ABO/Rh(D) O POSITIVE 2020 04:51 PM    Rubella, External NonImmune 2016    GrBStrep, External Positive 2017    ABO,Rh O Positive 2016      Immunization History   Administered Date(s) Administered    MMR 2014, 2016    Tdap 2015       * Procedures:   * No surgery found *           * Discharge Condition: good    * Hospital Course: Normal hospital course following the delivery. * Disposition: Home    Discharge Medications:   Discharge Medication List as of 3/18/2020  2:40 PM      START taking these medications    Details   oxyCODONE-acetaminophen (PERCOCET) 5-325 mg per tablet Take 1 Tab by mouth every four (4) hours as needed for Pain for up to 3 days. Max Daily Amount: 6 Tabs., Print, Disp-10 Tab, R-0      docusate sodium (COLACE) 100 mg capsule Take 1 Cap by mouth daily as needed for Constipation for up to 90 days.  Indications: constipation, Print, Disp-60 Cap, R-0         CONTINUE these medications which have CHANGED    Details   ibuprofen (MOTRIN) 800 mg tablet Take 1 Tab by mouth every eight (8) hours. Indications: pain, Print, Disp-30 Tab, R-0         CONTINUE these medications which have NOT CHANGED    Details   pediatric multivitamins (FLINTSTONES MULTIVITAMIN) chewable tablet 1 Tab, Oral, DAILY, Until Discontinued, Historical Med         STOP taking these medications       oxyCODONE-acetaminophen (PERCOCET 7.5) 7.5-325 mg per tablet Comments:   Reason for Stopping:         zolpidem (AMBIEN) 10 mg tablet Comments:   Reason for Stopping:               * Follow-up Care/Patient Instructions: Activity: Activity as tolerated  Diet: Regular Diet  Wound Care: None needed    Follow-up Information     Follow up With Specialties Details Why Contact Info    Jewels Shepherd MD Obstetrics & Gynecology, Gynecology, Obstetrics In 1 month Follow up with [facility] in 1 month; call to schedule appointment. [address]  [address]  [phone]                        .

## 2020-03-21 LAB
SPECIMEN SOURCE: NORMAL
VZV DNA SPEC QL NAA+PROBE: NEGATIVE

## 2022-03-19 PROBLEM — Z34.90 ENCOUNTER FOR ELECTIVE INDUCTION OF LABOR: Status: ACTIVE | Noted: 2020-03-16

## 2022-03-19 PROBLEM — Z34.90 PREGNANCY: Status: ACTIVE | Noted: 2017-02-27

## 2022-03-19 PROBLEM — Z64.1 GRAND MULTIPARA: Status: ACTIVE | Noted: 2020-03-16

## 2023-05-11 RX ORDER — IBUPROFEN 800 MG/1
TABLET ORAL EVERY 8 HOURS
COMMUNITY
Start: 2020-03-18

## 2023-07-03 LAB
ABO, EXTERNAL RESULT: NORMAL
HEP B, EXTERNAL RESULT: NEGATIVE
HIV, EXTERNAL RESULT: NORMAL
RH FACTOR, EXTERNAL RESULT: POSITIVE
RPR, EXTERNAL RESULT: NORMAL
RUBELLA TITER, EXTERNAL RESULT: NORMAL

## 2023-12-05 LAB — GBS, EXTERNAL RESULT: POSITIVE

## 2023-12-12 ENCOUNTER — HOSPITAL ENCOUNTER (INPATIENT)
Facility: HOSPITAL | Age: 38
LOS: 2 days | Discharge: HOME | DRG: 560 | End: 2023-12-14
Attending: SPECIALIST | Admitting: OBSTETRICS & GYNECOLOGY
Payer: COMMERCIAL

## 2023-12-12 ENCOUNTER — ANESTHESIA (OUTPATIENT)
Dept: LABOR AND DELIVERY | Facility: HOSPITAL | Age: 38
DRG: 560 | End: 2023-12-12
Payer: COMMERCIAL

## 2023-12-12 DIAGNOSIS — R52 PAIN: Primary | ICD-10-CM

## 2023-12-12 PROBLEM — O42.90 PROM (PREMATURE RUPTURE OF MEMBRANES): Status: ACTIVE | Noted: 2023-12-12

## 2023-12-12 LAB
ABO + RH BLD: NORMAL
ALBUMIN SERPL-MCNC: 2.8 G/DL (ref 3.5–5)
ALBUMIN/GLOB SERPL: 0.6 (ref 1.1–2.2)
ALP SERPL-CCNC: 121 U/L (ref 45–117)
ALT SERPL-CCNC: 10 U/L (ref 12–78)
AMNISURE, POC: POSITIVE
AMPHET UR QL SCN: NEGATIVE
ANION GAP SERPL CALC-SCNC: 8 MMOL/L (ref 5–15)
AST SERPL-CCNC: 11 U/L (ref 15–37)
BARBITURATES UR QL SCN: NEGATIVE
BASOPHILS # BLD: 0 K/UL (ref 0–0.1)
BASOPHILS NFR BLD: 0 % (ref 0–1)
BENZODIAZ UR QL: NEGATIVE
BILIRUB SERPL-MCNC: 0.3 MG/DL (ref 0.2–1)
BLOOD GROUP ANTIBODIES SERPL: NORMAL
BUN SERPL-MCNC: 6 MG/DL (ref 6–20)
BUN/CREAT SERPL: 11 (ref 12–20)
CALCIUM SERPL-MCNC: 9.2 MG/DL (ref 8.5–10.1)
CANNABINOIDS UR QL SCN: NEGATIVE
CHLORIDE SERPL-SCNC: 107 MMOL/L (ref 97–108)
CO2 SERPL-SCNC: 22 MMOL/L (ref 21–32)
COCAINE UR QL SCN: NEGATIVE
CREAT SERPL-MCNC: 0.53 MG/DL (ref 0.55–1.02)
DIFFERENTIAL METHOD BLD: ABNORMAL
EOSINOPHIL # BLD: 0.1 K/UL (ref 0–0.4)
EOSINOPHIL NFR BLD: 1 % (ref 0–7)
ERYTHROCYTE [DISTWIDTH] IN BLOOD BY AUTOMATED COUNT: 13.2 % (ref 11.5–14.5)
GLOBULIN SER CALC-MCNC: 4.5 G/DL (ref 2–4)
GLUCOSE SERPL-MCNC: 130 MG/DL (ref 65–100)
HCT VFR BLD AUTO: 28.5 % (ref 35–47)
HGB BLD-MCNC: 9.5 G/DL (ref 11.5–16)
IMM GRANULOCYTES # BLD AUTO: 0.1 K/UL (ref 0–0.04)
IMM GRANULOCYTES NFR BLD AUTO: 1 % (ref 0–0.5)
LYMPHOCYTES # BLD: 1.1 K/UL (ref 0.8–3.5)
LYMPHOCYTES NFR BLD: 8 % (ref 12–49)
Lab: NORMAL
Lab: NORMAL
MCH RBC QN AUTO: 29.4 PG (ref 26–34)
MCHC RBC AUTO-ENTMCNC: 33.3 G/DL (ref 30–36.5)
MCV RBC AUTO: 88.2 FL (ref 80–99)
METHADONE UR QL: NEGATIVE
MONOCYTES # BLD: 1.1 K/UL (ref 0–1)
MONOCYTES NFR BLD: 8 % (ref 5–13)
NEGATIVE QC PASS/FAIL: NORMAL
NEUTS SEG # BLD: 11.9 K/UL (ref 1.8–8)
NEUTS SEG NFR BLD: 82 % (ref 32–75)
NRBC # BLD: 0 K/UL (ref 0–0.01)
NRBC BLD-RTO: 0 PER 100 WBC
OPIATES UR QL: NEGATIVE
PCP UR QL: NEGATIVE
PLATELET # BLD AUTO: 247 K/UL (ref 150–400)
PMV BLD AUTO: 10.9 FL (ref 8.9–12.9)
POSITIVE QC PASS/FAIL: NORMAL
POTASSIUM SERPL-SCNC: 3.3 MMOL/L (ref 3.5–5.1)
PROT SERPL-MCNC: 7.3 G/DL (ref 6.4–8.2)
RBC # BLD AUTO: 3.23 M/UL (ref 3.8–5.2)
SODIUM SERPL-SCNC: 137 MMOL/L (ref 136–145)
SPECIMEN EXP DATE BLD: NORMAL
WBC # BLD AUTO: 14.3 K/UL (ref 3.6–11)

## 2023-12-12 PROCEDURE — 80053 COMPREHEN METABOLIC PANEL: CPT

## 2023-12-12 PROCEDURE — 36415 COLL VENOUS BLD VENIPUNCTURE: CPT

## 2023-12-12 PROCEDURE — 2580000003 HC RX 258: Performed by: OBSTETRICS & GYNECOLOGY

## 2023-12-12 PROCEDURE — 99203 OFFICE O/P NEW LOW 30 MIN: CPT

## 2023-12-12 PROCEDURE — 6360000002 HC RX W HCPCS: Performed by: OBSTETRICS & GYNECOLOGY

## 2023-12-12 PROCEDURE — 86900 BLOOD TYPING SEROLOGIC ABO: CPT

## 2023-12-12 PROCEDURE — 86901 BLOOD TYPING SEROLOGIC RH(D): CPT

## 2023-12-12 PROCEDURE — 7210000100 HC LABOR FEE PER 1 HR

## 2023-12-12 PROCEDURE — 1120000000 HC RM PRIVATE OB

## 2023-12-12 PROCEDURE — 6360000002 HC RX W HCPCS

## 2023-12-12 PROCEDURE — 3700000156 HC EPIDURAL ANESTHESIA

## 2023-12-12 PROCEDURE — 85025 COMPLETE CBC W/AUTO DIFF WBC: CPT

## 2023-12-12 PROCEDURE — 80307 DRUG TEST PRSMV CHEM ANLYZR: CPT

## 2023-12-12 PROCEDURE — 86850 RBC ANTIBODY SCREEN: CPT

## 2023-12-12 RX ORDER — BETAMETHASONE SODIUM PHOSPHATE AND BETAMETHASONE ACETATE 3; 3 MG/ML; MG/ML
INJECTION, SUSPENSION INTRA-ARTICULAR; INTRALESIONAL; INTRAMUSCULAR; SOFT TISSUE
Status: COMPLETED
Start: 2023-12-12 | End: 2023-12-12

## 2023-12-12 RX ORDER — DIPHENHYDRAMINE HCL 25 MG
25 CAPSULE ORAL EVERY 4 HOURS PRN
Status: DISCONTINUED | OUTPATIENT
Start: 2023-12-12 | End: 2023-12-13

## 2023-12-12 RX ORDER — SODIUM CHLORIDE 0.9 % (FLUSH) 0.9 %
5-40 SYRINGE (ML) INJECTION PRN
Status: DISCONTINUED | OUTPATIENT
Start: 2023-12-12 | End: 2023-12-13

## 2023-12-12 RX ORDER — MISOPROSTOL 200 UG/1
400 TABLET ORAL PRN
Status: DISCONTINUED | OUTPATIENT
Start: 2023-12-12 | End: 2023-12-12

## 2023-12-12 RX ORDER — SODIUM CHLORIDE, SODIUM LACTATE, POTASSIUM CHLORIDE, CALCIUM CHLORIDE 600; 310; 30; 20 MG/100ML; MG/100ML; MG/100ML; MG/100ML
INJECTION, SOLUTION INTRAVENOUS CONTINUOUS
Status: DISCONTINUED | OUTPATIENT
Start: 2023-12-12 | End: 2023-12-13

## 2023-12-12 RX ORDER — SODIUM CHLORIDE, SODIUM LACTATE, POTASSIUM CHLORIDE, AND CALCIUM CHLORIDE .6; .31; .03; .02 G/100ML; G/100ML; G/100ML; G/100ML
1000 INJECTION, SOLUTION INTRAVENOUS PRN
Status: DISCONTINUED | OUTPATIENT
Start: 2023-12-12 | End: 2023-12-13

## 2023-12-12 RX ORDER — CARBOPROST TROMETHAMINE 250 UG/ML
250 INJECTION, SOLUTION INTRAMUSCULAR PRN
OUTPATIENT
Start: 2023-12-12

## 2023-12-12 RX ORDER — METHYLERGONOVINE MALEATE 0.2 MG/ML
200 INJECTION INTRAVENOUS PRN
Status: DISCONTINUED | OUTPATIENT
Start: 2023-12-12 | End: 2023-12-13

## 2023-12-12 RX ORDER — MISOPROSTOL 200 UG/1
TABLET ORAL
Status: COMPLETED
Start: 2023-12-12 | End: 2023-12-13

## 2023-12-12 RX ORDER — ONDANSETRON 2 MG/ML
4 INJECTION INTRAMUSCULAR; INTRAVENOUS EVERY 6 HOURS PRN
Status: DISCONTINUED | OUTPATIENT
Start: 2023-12-12 | End: 2023-12-13

## 2023-12-12 RX ORDER — SODIUM CHLORIDE 0.9 % (FLUSH) 0.9 %
5-40 SYRINGE (ML) INJECTION EVERY 12 HOURS SCHEDULED
Status: DISCONTINUED | OUTPATIENT
Start: 2023-12-12 | End: 2023-12-13

## 2023-12-12 RX ORDER — MISOPROSTOL 200 UG/1
800 TABLET ORAL PRN
Status: DISCONTINUED | OUTPATIENT
Start: 2023-12-12 | End: 2023-12-13

## 2023-12-12 RX ORDER — ONDANSETRON 4 MG/1
4 TABLET, FILM COATED ORAL EVERY 8 HOURS PRN
COMMUNITY

## 2023-12-12 RX ORDER — FENTANYL 0.2 MG/100ML-BUPIV 0.125%-NACL 0.9% EPIDURAL INJ 2/0.125 MCG/ML-%
SOLUTION INJECTION
Status: COMPLETED
Start: 2023-12-12 | End: 2023-12-13

## 2023-12-12 RX ORDER — BETAMETHASONE SODIUM PHOSPHATE AND BETAMETHASONE ACETATE 3; 3 MG/ML; MG/ML
12 INJECTION, SUSPENSION INTRA-ARTICULAR; INTRALESIONAL; INTRAMUSCULAR; SOFT TISSUE ONCE
Status: COMPLETED | OUTPATIENT
Start: 2023-12-13 | End: 2023-12-12

## 2023-12-12 RX ORDER — SODIUM CHLORIDE, SODIUM LACTATE, POTASSIUM CHLORIDE, AND CALCIUM CHLORIDE .6; .31; .03; .02 G/100ML; G/100ML; G/100ML; G/100ML
500 INJECTION, SOLUTION INTRAVENOUS PRN
Status: DISCONTINUED | OUTPATIENT
Start: 2023-12-12 | End: 2023-12-13

## 2023-12-12 RX ORDER — CARBOPROST TROMETHAMINE 250 UG/ML
250 INJECTION, SOLUTION INTRAMUSCULAR
Status: DISCONTINUED | OUTPATIENT
Start: 2023-12-12 | End: 2023-12-13

## 2023-12-12 RX ORDER — DOCUSATE SODIUM 100 MG/1
100 CAPSULE, LIQUID FILLED ORAL 2 TIMES DAILY
Status: DISCONTINUED | OUTPATIENT
Start: 2023-12-12 | End: 2023-12-13

## 2023-12-12 RX ORDER — BUPIVACAINE HYDROCHLORIDE 2.5 MG/ML
INJECTION, SOLUTION EPIDURAL; INFILTRATION; INTRACAUDAL
Status: COMPLETED
Start: 2023-12-12 | End: 2023-12-13

## 2023-12-12 RX ORDER — MISOPROSTOL 200 UG/1
800 TABLET ORAL PRN
OUTPATIENT
Start: 2023-12-12

## 2023-12-12 RX ORDER — METHYLERGONOVINE MALEATE 0.2 MG/ML
200 INJECTION INTRAVENOUS PRN
OUTPATIENT
Start: 2023-12-12

## 2023-12-12 RX ORDER — SODIUM CHLORIDE, SODIUM LACTATE, POTASSIUM CHLORIDE, CALCIUM CHLORIDE 600; 310; 30; 20 MG/100ML; MG/100ML; MG/100ML; MG/100ML
INJECTION, SOLUTION INTRAVENOUS CONTINUOUS
Status: DISCONTINUED | OUTPATIENT
Start: 2023-12-12 | End: 2023-12-12

## 2023-12-12 RX ORDER — SODIUM CHLORIDE 9 MG/ML
25 INJECTION, SOLUTION INTRAVENOUS PRN
Status: DISCONTINUED | OUTPATIENT
Start: 2023-12-12 | End: 2023-12-13

## 2023-12-12 RX ADMIN — SODIUM CHLORIDE 5 MILLION UNITS: 900 INJECTION INTRAVENOUS at 23:20

## 2023-12-12 RX ADMIN — BETAMETHASONE SODIUM PHOSPHATE AND BETAMETHASONE ACETATE 12 MG: 3; 3 INJECTION, SUSPENSION INTRA-ARTICULAR; INTRALESIONAL; INTRAMUSCULAR at 23:36

## 2023-12-12 RX ADMIN — SODIUM CHLORIDE, POTASSIUM CHLORIDE, SODIUM LACTATE AND CALCIUM CHLORIDE: 600; 310; 30; 20 INJECTION, SOLUTION INTRAVENOUS at 23:04

## 2023-12-12 RX ADMIN — BETAMETHASONE SODIUM PHOSPHATE AND BETAMETHASONE ACETATE 12 MG: 3; 3 INJECTION, SUSPENSION INTRA-ARTICULAR; INTRALESIONAL; INTRAMUSCULAR; SOFT TISSUE at 23:36

## 2023-12-13 PROCEDURE — 7220000101 HC DELIVERY VAGINAL/SINGLE

## 2023-12-13 PROCEDURE — 2500000003 HC RX 250 WO HCPCS

## 2023-12-13 PROCEDURE — 1120000000 HC RM PRIVATE OB

## 2023-12-13 PROCEDURE — 7210000100 HC LABOR FEE PER 1 HR

## 2023-12-13 PROCEDURE — 2580000003 HC RX 258: Performed by: OBSTETRICS & GYNECOLOGY

## 2023-12-13 PROCEDURE — 6360000002 HC RX W HCPCS: Performed by: ANESTHESIOLOGY

## 2023-12-13 PROCEDURE — 51702 INSERT TEMP BLADDER CATH: CPT

## 2023-12-13 PROCEDURE — 6370000000 HC RX 637 (ALT 250 FOR IP): Performed by: OBSTETRICS & GYNECOLOGY

## 2023-12-13 PROCEDURE — 6370000000 HC RX 637 (ALT 250 FOR IP)

## 2023-12-13 PROCEDURE — 4A1HXCZ MONITORING OF PRODUCTS OF CONCEPTION, CARDIAC RATE, EXTERNAL APPROACH: ICD-10-PCS | Performed by: OBSTETRICS & GYNECOLOGY

## 2023-12-13 PROCEDURE — 6360000002 HC RX W HCPCS: Performed by: OBSTETRICS & GYNECOLOGY

## 2023-12-13 RX ORDER — ACETAMINOPHEN 500 MG
1000 TABLET ORAL EVERY 8 HOURS SCHEDULED
Status: DISCONTINUED | OUTPATIENT
Start: 2023-12-13 | End: 2023-12-14 | Stop reason: HOSPADM

## 2023-12-13 RX ORDER — SODIUM CHLORIDE 9 MG/ML
INJECTION, SOLUTION INTRAVENOUS PRN
Status: DISCONTINUED | OUTPATIENT
Start: 2023-12-13 | End: 2023-12-14 | Stop reason: HOSPADM

## 2023-12-13 RX ORDER — IBUPROFEN 400 MG/1
800 TABLET ORAL EVERY 8 HOURS SCHEDULED
Status: DISCONTINUED | OUTPATIENT
Start: 2023-12-13 | End: 2023-12-14 | Stop reason: HOSPADM

## 2023-12-13 RX ORDER — NALOXONE HYDROCHLORIDE 0.4 MG/ML
INJECTION, SOLUTION INTRAMUSCULAR; INTRAVENOUS; SUBCUTANEOUS PRN
Status: DISCONTINUED | OUTPATIENT
Start: 2023-12-13 | End: 2023-12-13

## 2023-12-13 RX ORDER — ONDANSETRON 2 MG/ML
4 INJECTION INTRAMUSCULAR; INTRAVENOUS EVERY 6 HOURS PRN
Status: DISCONTINUED | OUTPATIENT
Start: 2023-12-13 | End: 2023-12-13

## 2023-12-13 RX ORDER — OXYCODONE HYDROCHLORIDE 5 MG/1
5 TABLET ORAL EVERY 4 HOURS PRN
Status: DISCONTINUED | OUTPATIENT
Start: 2023-12-13 | End: 2023-12-14 | Stop reason: HOSPADM

## 2023-12-13 RX ORDER — FENTANYL CITRATE 50 UG/ML
INJECTION, SOLUTION INTRAMUSCULAR; INTRAVENOUS PRN
Status: DISCONTINUED | OUTPATIENT
Start: 2023-12-13 | End: 2023-12-13 | Stop reason: SDUPTHER

## 2023-12-13 RX ORDER — SODIUM CHLORIDE 0.9 % (FLUSH) 0.9 %
5-40 SYRINGE (ML) INJECTION EVERY 12 HOURS SCHEDULED
Status: DISCONTINUED | OUTPATIENT
Start: 2023-12-13 | End: 2023-12-13

## 2023-12-13 RX ORDER — BUPIVACAINE HYDROCHLORIDE 2.5 MG/ML
INJECTION, SOLUTION EPIDURAL; INFILTRATION; INTRACAUDAL PRN
Status: DISCONTINUED | OUTPATIENT
Start: 2023-12-13 | End: 2023-12-13 | Stop reason: SDUPTHER

## 2023-12-13 RX ORDER — FENTANYL 0.2 MG/100ML-BUPIV 0.125%-NACL 0.9% EPIDURAL INJ 2/0.125 MCG/ML-%
12 SOLUTION INJECTION CONTINUOUS
Status: DISCONTINUED | OUTPATIENT
Start: 2023-12-13 | End: 2023-12-13

## 2023-12-13 RX ORDER — DOCUSATE SODIUM 100 MG/1
100 CAPSULE, LIQUID FILLED ORAL 2 TIMES DAILY
Status: DISCONTINUED | OUTPATIENT
Start: 2023-12-13 | End: 2023-12-14 | Stop reason: HOSPADM

## 2023-12-13 RX ORDER — SODIUM CHLORIDE 0.9 % (FLUSH) 0.9 %
5-40 SYRINGE (ML) INJECTION PRN
Status: DISCONTINUED | OUTPATIENT
Start: 2023-12-13 | End: 2023-12-14 | Stop reason: HOSPADM

## 2023-12-13 RX ORDER — LANOLIN/MINERAL OIL
LOTION (ML) TOPICAL PRN
Status: DISCONTINUED | OUTPATIENT
Start: 2023-12-13 | End: 2023-12-14 | Stop reason: HOSPADM

## 2023-12-13 RX ADMIN — SODIUM CHLORIDE, PRESERVATIVE FREE 10 ML: 5 INJECTION INTRAVENOUS at 02:59

## 2023-12-13 RX ADMIN — MISOPROSTOL 800 MCG: 200 TABLET ORAL at 03:11

## 2023-12-13 RX ADMIN — Medication 12 ML/HR: at 00:29

## 2023-12-13 RX ADMIN — BUPIVACAINE HYDROCHLORIDE 3 ML: 2.5 INJECTION, SOLUTION EPIDURAL; INFILTRATION; INTRACAUDAL; PERINEURAL at 00:21

## 2023-12-13 RX ADMIN — WATER 2000 MG: 1 INJECTION INTRAMUSCULAR; INTRAVENOUS; SUBCUTANEOUS at 04:46

## 2023-12-13 RX ADMIN — DOCUSATE SODIUM 100 MG: 100 CAPSULE, LIQUID FILLED ORAL at 10:41

## 2023-12-13 RX ADMIN — BUPIVACAINE HYDROCHLORIDE 0.5 ML: 2.5 INJECTION, SOLUTION EPIDURAL; INFILTRATION; INTRACAUDAL at 00:19

## 2023-12-13 RX ADMIN — IBUPROFEN 800 MG: 400 TABLET, FILM COATED ORAL at 22:44

## 2023-12-13 RX ADMIN — IBUPROFEN 800 MG: 400 TABLET, FILM COATED ORAL at 14:30

## 2023-12-13 RX ADMIN — FENTANYL CITRATE 100 MCG: 50 INJECTION, SOLUTION INTRAMUSCULAR; INTRAVENOUS at 00:21

## 2023-12-13 RX ADMIN — BUPIVACAINE HYDROCHLORIDE 3 ML: 2.5 INJECTION, SOLUTION EPIDURAL; INFILTRATION; INTRACAUDAL; PERINEURAL at 00:20

## 2023-12-13 RX ADMIN — OXYCODONE 5 MG: 5 TABLET ORAL at 22:52

## 2023-12-13 RX ADMIN — OXYTOCIN 909.1 MILLI-UNITS/MIN: 10 INJECTION, SOLUTION INTRAMUSCULAR; INTRAVENOUS at 02:59

## 2023-12-13 RX ADMIN — IBUPROFEN 800 MG: 400 TABLET, FILM COATED ORAL at 05:59

## 2023-12-13 RX ADMIN — FENTANYL 0.2 MG/100ML-BUPIV 0.125%-NACL 0.9% EPIDURAL INJ 12 ML/HR: 2/0.125 SOLUTION at 00:29

## 2023-12-13 ASSESSMENT — PAIN - FUNCTIONAL ASSESSMENT
PAIN_FUNCTIONAL_ASSESSMENT: ACTIVITIES ARE NOT PREVENTED
PAIN_FUNCTIONAL_ASSESSMENT: ACTIVITIES ARE NOT PREVENTED

## 2023-12-13 ASSESSMENT — PAIN DESCRIPTION - DESCRIPTORS
DESCRIPTORS: CRAMPING
DESCRIPTORS: ACHING;CRAMPING;SORE
DESCRIPTORS: SORE

## 2023-12-13 ASSESSMENT — PAIN SCALES - GENERAL
PAINLEVEL_OUTOF10: 5
PAINLEVEL_OUTOF10: 5
PAINLEVEL_OUTOF10: 2

## 2023-12-13 ASSESSMENT — PAIN DESCRIPTION - ORIENTATION
ORIENTATION: MID;LOWER
ORIENTATION: LOWER

## 2023-12-13 ASSESSMENT — PAIN DESCRIPTION - LOCATION
LOCATION: BACK
LOCATION: ABDOMEN

## 2023-12-13 NOTE — ANESTHESIA POSTPROCEDURE EVALUATION
Department of Anesthesiology  Postprocedure Note    Patient: Georgina Aguilar  MRN: [id number]  YOB: 1985  Date of evaluation: 12/13/2023    Procedure Summary       Date: 12/13/23 Room / Location:     Anesthesia Start: 0015 Anesthesia Stop: 9763    Procedure: Labor Analgesia Diagnosis:     Scheduled Providers:  Responsible Provider: Chino Sevilla MD    Anesthesia Type: CSE ASA Status: 2            Anesthesia Type: No value filed. Missy Phase I:      Missy Phase II:      Anesthesia Post Evaluation    Patient location during evaluation: PACU  Patient participation: complete - patient participated  Level of consciousness: awake and alert  Pain score: 0  Airway patency: patent  Nausea & Vomiting: no vomiting and no nausea  Cardiovascular status: blood pressure returned to baseline and hemodynamically stable  Respiratory status: acceptable  Hydration status: stable  Pain management: satisfactory to patient    No notable events documented.

## 2023-12-13 NOTE — ANESTHESIA PRE PROCEDURE
Pulmonary:Negative Pulmonary ROS and normal exam                               Cardiovascular:Negative CV ROS                      Neuro/Psych:   Negative Neuro/Psych ROS              GI/Hepatic/Renal: Neg GI/Hepatic/Renal ROS            Endo/Other:    (+) blood dyscrasia (Hgb = 9.5 on 12/12/23): anemia:. Orion Ortega ROS comment: IUP  PROM (premature rupture of membranes)    Hx Gonorrhea  Hx Chlamydia  HPV/ASCUS   Abdominal: normal exam            Vascular: negative vascular ROS. Other Findings:         Anesthesia Plan      CSE     ASA 2             Anesthetic plan and risks discussed with patient.               Post-op pain plan if not by surgeon: continuous epidural        Dunia Boo MD   12/13/2023

## 2023-12-13 NOTE — ANESTHESIA PROCEDURE NOTES
CSE Block    Patient location during procedure: OB  Start time: 12/13/2023 12:15 AM  End time: 12/13/2023 12:23 AM  Reason for block: labor epidural  Staffing  Performed: anesthesiologist   Anesthesiologist: Alexsandra Correa MD  Performed by: Alexsandra Correa MD  Authorized by: Alexsandra Correa MD    CSE  Patient position: sitting  Prep: ChloraPrep  Patient monitoring: continuous pulse ox and frequent blood pressure checks  Approach: midline  Provider prep: mask and sterile gloves  Spinal Needle  Needle type: pencil-tip   Needle gauge: 25 G  Needle length: 6 in  Epidural Needle  Injection technique: KELLY saline  Needle type: Tuohy   Needle gauge: 17 G  Needle length: 6 in  Needle insertion depth: 5 cm  Location: lumbar (1-5)  Catheter  Catheter type: end hole  Catheter size: 19 G  Catheter at skin depth: 9 cm  Test dose: negative  [id number]  Hemodynamics: stable  Preanesthetic Checklist  Completed: patient identified, IV checked, site marked, risks and benefits discussed, surgical/procedural consents, equipment checked, pre-op evaluation, timeout performed, anesthesia consent given, oxygen available, monitors applied/VS acknowledged, fire risk safety assessment completed and verbalized and blood product R/B/A discussed and consented

## 2023-12-14 VITALS
SYSTOLIC BLOOD PRESSURE: 108 MMHG | HEIGHT: 64 IN | HEART RATE: 67 BPM | OXYGEN SATURATION: 97 % | WEIGHT: 171 LBS | BODY MASS INDEX: 29.19 KG/M2 | RESPIRATION RATE: 16 BRPM | TEMPERATURE: 98.2 F | DIASTOLIC BLOOD PRESSURE: 64 MMHG

## 2023-12-14 PROCEDURE — 6360000002 HC RX W HCPCS: Performed by: SPECIALIST

## 2023-12-14 PROCEDURE — 90471 IMMUNIZATION ADMIN: CPT | Performed by: SPECIALIST

## 2023-12-14 PROCEDURE — 90707 MMR VACCINE SC: CPT | Performed by: SPECIALIST

## 2023-12-14 PROCEDURE — 6370000000 HC RX 637 (ALT 250 FOR IP): Performed by: OBSTETRICS & GYNECOLOGY

## 2023-12-14 RX ORDER — OXYCODONE HYDROCHLORIDE AND ACETAMINOPHEN 5; 325 MG/1; MG/1
1 TABLET ORAL EVERY 6 HOURS PRN
Qty: 12 TABLET | Refills: 0 | Status: SHIPPED | OUTPATIENT
Start: 2023-12-14 | End: 2023-12-17

## 2023-12-14 RX ORDER — IBUPROFEN 800 MG/1
800 TABLET ORAL 2 TIMES DAILY PRN
Qty: 60 TABLET | Refills: 5 | Status: SHIPPED | OUTPATIENT
Start: 2023-12-14

## 2023-12-14 RX ADMIN — MEASLES, MUMPS, AND RUBELLA VIRUS VACCINE LIVE 0.5 ML: 1000; 12500; 1000 INJECTION, POWDER, LYOPHILIZED, FOR SUSPENSION SUBCUTANEOUS at 09:43

## 2023-12-14 RX ADMIN — IBUPROFEN 800 MG: 400 TABLET, FILM COATED ORAL at 07:20

## 2023-12-14 ASSESSMENT — PAIN - FUNCTIONAL ASSESSMENT: PAIN_FUNCTIONAL_ASSESSMENT: ACTIVITIES ARE NOT PREVENTED

## 2023-12-14 ASSESSMENT — PAIN DESCRIPTION - DESCRIPTORS: DESCRIPTORS: SORE;ACHING

## 2023-12-14 ASSESSMENT — PAIN SCALES - GENERAL: PAINLEVEL_OUTOF10: 1

## 2023-12-14 ASSESSMENT — PAIN DESCRIPTION - ORIENTATION: ORIENTATION: LOWER;MID

## 2023-12-14 ASSESSMENT — PAIN DESCRIPTION - LOCATION: LOCATION: ABDOMEN;PERINEUM

## 2023-12-14 NOTE — DISCHARGE INSTRUCTIONS
consciousness). You have chest pain, are short of breath, or cough up blood. You have a seizure. Where to get help 24 hours a day, 7 days a week   If you or someone you know talks about suicide, self-harm, a mental health crisis, a substance use crisis, or any other kind of emotional distress, get help right away. You can:    Call the Suicide and Crisis Lifeline at 65. Call 2-805-575-N[facility]MM (4-976.624.8299). Text HOME to 675008 to access the Crisis Text Line. Consider saving these numbers in your phone. Go to [employer] for more information or to chat online. Call your doctor now or seek immediate medical care if:    You have signs of hemorrhage (too much bleeding), such as:  Heavy vaginal bleeding. This means that you are soaking through one or more pads in an hour. Or you pass blood clots bigger than an egg. Feeling dizzy or lightheaded, or you feel like you may faint. Feeling so tired or weak that you cannot do your usual activities. A fast or irregular heartbeat. New or worse belly pain. You have signs of infection, such as:  A fever. Frequent or painful urination or blood in your urine. Vaginal discharge that smells bad. New or worse belly pain. You have symptoms of a blood clot in your leg (called a deep vein thrombosis), such as:  Pain in the calf, back of the knee, thigh, or groin. Swelling in the leg or groin. A color change on the leg or groin. The skin may be reddish or purplish, depending on your usual skin color. You have signs of preeclampsia, such as:  Sudden swelling of your face, hands, or feet. New vision problems (such as dimness, blurring, or seeing spots). A severe headache. You have signs of heart failure, such as:  New or increased shortness of breath. New or worse swelling in your legs, ankles, or feet. Sudden weight gain, such as more than 2 to 3 pounds in a day or 5 pounds in a week.   Feeling so tired or weak that you cannot do your